# Patient Record
Sex: MALE | Race: WHITE | NOT HISPANIC OR LATINO | URBAN - METROPOLITAN AREA
[De-identification: names, ages, dates, MRNs, and addresses within clinical notes are randomized per-mention and may not be internally consistent; named-entity substitution may affect disease eponyms.]

---

## 2017-04-21 ENCOUNTER — COMMUNICATION - HEALTHEAST (OUTPATIENT)
Dept: FAMILY MEDICINE | Facility: CLINIC | Age: 72
End: 2017-04-21

## 2017-04-28 ENCOUNTER — RECORDS - HEALTHEAST (OUTPATIENT)
Dept: ADMINISTRATIVE | Facility: OTHER | Age: 72
End: 2017-04-28

## 2017-05-08 ENCOUNTER — OFFICE VISIT - HEALTHEAST (OUTPATIENT)
Dept: FAMILY MEDICINE | Facility: CLINIC | Age: 72
End: 2017-05-08

## 2017-05-08 DIAGNOSIS — Z01.818 PREOP EXAMINATION: ICD-10-CM

## 2017-05-08 DIAGNOSIS — E11.9 DIABETES (H): ICD-10-CM

## 2017-05-08 LAB
CHOLEST SERPL-MCNC: 129 MG/DL
FASTING STATUS PATIENT QL REPORTED: NO
HBA1C MFR BLD: 7.4 % (ref 3.5–6)
HDLC SERPL-MCNC: 39 MG/DL
LDLC SERPL CALC-MCNC: 46 MG/DL
TRIGL SERPL-MCNC: 218 MG/DL

## 2017-05-08 ASSESSMENT — MIFFLIN-ST. JEOR: SCORE: 1943.64

## 2017-05-09 ENCOUNTER — COMMUNICATION - HEALTHEAST (OUTPATIENT)
Dept: FAMILY MEDICINE | Facility: CLINIC | Age: 72
End: 2017-05-09

## 2017-05-11 ASSESSMENT — MIFFLIN-ST. JEOR
SCORE: 1943.28
SCORE: 1943.28

## 2017-05-16 ENCOUNTER — ANESTHESIA - HEALTHEAST (OUTPATIENT)
Dept: SURGERY | Facility: CLINIC | Age: 72
End: 2017-05-16

## 2017-05-16 ENCOUNTER — SURGERY - HEALTHEAST (OUTPATIENT)
Dept: SURGERY | Facility: CLINIC | Age: 72
End: 2017-05-16

## 2017-05-17 ENCOUNTER — ANESTHESIA - HEALTHEAST (OUTPATIENT)
Dept: SURGERY | Facility: CLINIC | Age: 72
End: 2017-05-17

## 2017-05-17 ENCOUNTER — SURGERY - HEALTHEAST (OUTPATIENT)
Dept: SURGERY | Facility: CLINIC | Age: 72
End: 2017-05-17

## 2017-05-17 ASSESSMENT — MIFFLIN-ST. JEOR
SCORE: 1955.98
SCORE: 1955.98

## 2017-05-24 ENCOUNTER — OFFICE VISIT - HEALTHEAST (OUTPATIENT)
Dept: FAMILY MEDICINE | Facility: CLINIC | Age: 72
End: 2017-05-24

## 2017-05-24 DIAGNOSIS — I10 ESSENTIAL HYPERTENSION WITH GOAL BLOOD PRESSURE LESS THAN 130/80: ICD-10-CM

## 2017-05-24 DIAGNOSIS — I25.10 CARDIOVASCULAR DISEASE: ICD-10-CM

## 2017-06-02 ENCOUNTER — RECORDS - HEALTHEAST (OUTPATIENT)
Dept: ADMINISTRATIVE | Facility: OTHER | Age: 72
End: 2017-06-02

## 2017-06-16 ENCOUNTER — RECORDS - HEALTHEAST (OUTPATIENT)
Dept: ADMINISTRATIVE | Facility: OTHER | Age: 72
End: 2017-06-16

## 2017-07-14 ENCOUNTER — RECORDS - HEALTHEAST (OUTPATIENT)
Dept: ADMINISTRATIVE | Facility: OTHER | Age: 72
End: 2017-07-14

## 2017-07-31 ENCOUNTER — COMMUNICATION - HEALTHEAST (OUTPATIENT)
Dept: FAMILY MEDICINE | Facility: CLINIC | Age: 72
End: 2017-07-31

## 2017-08-25 ENCOUNTER — RECORDS - HEALTHEAST (OUTPATIENT)
Dept: ADMINISTRATIVE | Facility: OTHER | Age: 72
End: 2017-08-25

## 2017-09-11 ENCOUNTER — OFFICE VISIT - HEALTHEAST (OUTPATIENT)
Dept: PODIATRY | Facility: CLINIC | Age: 72
End: 2017-09-11

## 2017-09-11 DIAGNOSIS — M20.40 HAMMER TOE, UNSPECIFIED LATERALITY: ICD-10-CM

## 2017-09-11 DIAGNOSIS — M21.6X9 CAVUS DEFORMITY OF FOOT, UNSPECIFIED LATERALITY: ICD-10-CM

## 2017-09-11 DIAGNOSIS — L60.2 ONYCHAUXIS: ICD-10-CM

## 2017-09-15 ENCOUNTER — AMBULATORY - HEALTHEAST (OUTPATIENT)
Dept: PODIATRY | Facility: CLINIC | Age: 72
End: 2017-09-15

## 2017-09-15 ENCOUNTER — RECORDS - HEALTHEAST (OUTPATIENT)
Dept: ADMINISTRATIVE | Facility: OTHER | Age: 72
End: 2017-09-15

## 2017-09-27 ENCOUNTER — RECORDS - HEALTHEAST (OUTPATIENT)
Dept: ADMINISTRATIVE | Facility: OTHER | Age: 72
End: 2017-09-27

## 2017-10-05 ENCOUNTER — COMMUNICATION - HEALTHEAST (OUTPATIENT)
Dept: ADMINISTRATIVE | Facility: CLINIC | Age: 72
End: 2017-10-05

## 2017-10-10 ENCOUNTER — RECORDS - HEALTHEAST (OUTPATIENT)
Dept: ADMINISTRATIVE | Facility: OTHER | Age: 72
End: 2017-10-10

## 2017-10-12 ENCOUNTER — AMBULATORY - HEALTHEAST (OUTPATIENT)
Dept: PODIATRY | Facility: CLINIC | Age: 72
End: 2017-10-12

## 2017-10-26 ENCOUNTER — AMBULATORY - HEALTHEAST (OUTPATIENT)
Dept: PODIATRY | Facility: CLINIC | Age: 72
End: 2017-10-26

## 2017-10-26 DIAGNOSIS — M20.42 HAMMER TOES OF BOTH FEET: ICD-10-CM

## 2017-10-26 DIAGNOSIS — G62.9 NEUROPATHY: ICD-10-CM

## 2017-10-26 DIAGNOSIS — M20.41 HAMMER TOES OF BOTH FEET: ICD-10-CM

## 2017-10-26 DIAGNOSIS — E11.8 TYPE 2 DIABETES MELLITUS WITH COMPLICATION, WITHOUT LONG-TERM CURRENT USE OF INSULIN (H): ICD-10-CM

## 2017-10-26 DIAGNOSIS — Q66.70 CAVUS DEFORMITY: ICD-10-CM

## 2017-10-30 ENCOUNTER — RECORDS - HEALTHEAST (OUTPATIENT)
Dept: ADMINISTRATIVE | Facility: OTHER | Age: 72
End: 2017-10-30

## 2017-11-02 ENCOUNTER — COMMUNICATION - HEALTHEAST (OUTPATIENT)
Dept: ADMINISTRATIVE | Facility: CLINIC | Age: 72
End: 2017-11-02

## 2018-01-01 ENCOUNTER — COMMUNICATION - HEALTHEAST (OUTPATIENT)
Dept: FAMILY MEDICINE | Facility: CLINIC | Age: 73
End: 2018-01-01

## 2018-01-01 ENCOUNTER — COMMUNICATION - HEALTHEAST (OUTPATIENT)
Dept: OTHER | Facility: CLINIC | Age: 73
End: 2018-01-01

## 2018-01-01 ENCOUNTER — OFFICE VISIT - HEALTHEAST (OUTPATIENT)
Dept: OTOLARYNGOLOGY | Facility: CLINIC | Age: 73
End: 2018-01-01

## 2018-01-01 ENCOUNTER — AMBULATORY - HEALTHEAST (OUTPATIENT)
Dept: OTHER | Facility: CLINIC | Age: 73
End: 2018-01-01

## 2018-01-01 ENCOUNTER — OFFICE VISIT - HEALTHEAST (OUTPATIENT)
Dept: PODIATRY | Facility: CLINIC | Age: 73
End: 2018-01-01

## 2018-01-01 ENCOUNTER — OFFICE VISIT - HEALTHEAST (OUTPATIENT)
Dept: FAMILY MEDICINE | Facility: CLINIC | Age: 73
End: 2018-01-01

## 2018-01-01 DIAGNOSIS — E11.8 TYPE 2 DIABETES MELLITUS WITH COMPLICATION, WITHOUT LONG-TERM CURRENT USE OF INSULIN (H): ICD-10-CM

## 2018-01-01 DIAGNOSIS — G25.0 ESSENTIAL TREMOR: ICD-10-CM

## 2018-01-01 DIAGNOSIS — G60.9 HEREDITARY AND IDIOPATHIC PERIPHERAL NEUROPATHY: ICD-10-CM

## 2018-01-01 DIAGNOSIS — E78.5 HYPERLIPIDEMIA, UNSPECIFIED HYPERLIPIDEMIA TYPE: ICD-10-CM

## 2018-01-01 DIAGNOSIS — Z23 NEED FOR IMMUNIZATION AGAINST INFLUENZA: ICD-10-CM

## 2018-01-01 DIAGNOSIS — R35.0 BENIGN PROSTATIC HYPERPLASIA WITH URINARY FREQUENCY: ICD-10-CM

## 2018-01-01 DIAGNOSIS — R35.0 URINARY FREQUENCY: ICD-10-CM

## 2018-01-01 DIAGNOSIS — E11.49 TYPE 2 DIABETES MELLITUS WITH OTHER DIABETIC NEUROLOGICAL COMPLICATION (H): ICD-10-CM

## 2018-01-01 DIAGNOSIS — I10 ESSENTIAL HYPERTENSION: ICD-10-CM

## 2018-01-01 DIAGNOSIS — R26.81 GAIT INSTABILITY: ICD-10-CM

## 2018-01-01 DIAGNOSIS — M20.41 OTHER HAMMER TOE(S) (ACQUIRED), RIGHT FOOT: ICD-10-CM

## 2018-01-01 DIAGNOSIS — N40.1 BENIGN PROSTATIC HYPERPLASIA WITH URINARY FREQUENCY: ICD-10-CM

## 2018-01-01 DIAGNOSIS — Z00.00 MEDICARE ANNUAL WELLNESS VISIT, SUBSEQUENT: ICD-10-CM

## 2018-01-01 DIAGNOSIS — H61.23 HEARING LOSS DUE TO CERUMEN IMPACTION, BILATERAL: ICD-10-CM

## 2018-01-01 DIAGNOSIS — E03.9 HYPOTHYROIDISM, UNSPECIFIED TYPE: ICD-10-CM

## 2018-01-01 DIAGNOSIS — M20.41 HAMMER TOES OF BOTH FEET: ICD-10-CM

## 2018-01-01 DIAGNOSIS — M20.42 HAMMER TOES OF BOTH FEET: ICD-10-CM

## 2018-01-01 DIAGNOSIS — Q66.70 CONGENITAL PES CAVUS: ICD-10-CM

## 2018-01-01 DIAGNOSIS — E11.9 DIABETES MELLITUS, TYPE 2 (H): ICD-10-CM

## 2018-01-01 DIAGNOSIS — M20.42 OTHER HAMMER TOE(S) (ACQUIRED), LEFT FOOT: ICD-10-CM

## 2018-01-01 DIAGNOSIS — Z00.00 HEALTHCARE MAINTENANCE: ICD-10-CM

## 2018-01-01 DIAGNOSIS — Q66.70 CAVUS DEFORMITY: ICD-10-CM

## 2018-01-01 DIAGNOSIS — R53.83 FATIGUE: ICD-10-CM

## 2018-01-01 LAB
25(OH)D3 SERPL-MCNC: 47.2 NG/ML (ref 30–80)
ALBUMIN SERPL-MCNC: 4.2 G/DL (ref 3.5–5)
ALP SERPL-CCNC: 50 U/L (ref 45–120)
ALT SERPL W P-5'-P-CCNC: 19 U/L (ref 0–45)
ANION GAP SERPL CALCULATED.3IONS-SCNC: 14 MMOL/L (ref 5–18)
AST SERPL W P-5'-P-CCNC: 19 U/L (ref 0–40)
BILIRUB SERPL-MCNC: 0.4 MG/DL (ref 0–1)
BUN SERPL-MCNC: 21 MG/DL (ref 8–28)
CALCIUM SERPL-MCNC: 10.4 MG/DL (ref 8.5–10.5)
CHLORIDE BLD-SCNC: 102 MMOL/L (ref 98–107)
CHOLEST SERPL-MCNC: 137 MG/DL
CO2 SERPL-SCNC: 22 MMOL/L (ref 22–31)
CREAT SERPL-MCNC: 0.99 MG/DL (ref 0.7–1.3)
ERYTHROCYTE [DISTWIDTH] IN BLOOD BY AUTOMATED COUNT: 11.8 % (ref 11–14.5)
FASTING STATUS PATIENT QL REPORTED: NO
GFR SERPL CREATININE-BSD FRML MDRD: >60 ML/MIN/1.73M2
GLUCOSE BLD-MCNC: 220 MG/DL (ref 70–125)
HBA1C MFR BLD: 7 % (ref 3.5–6)
HCT VFR BLD AUTO: 43.5 % (ref 40–54)
HDLC SERPL-MCNC: 50 MG/DL
HGB BLD-MCNC: 14.3 G/DL (ref 14–18)
LDLC SERPL CALC-MCNC: 55 MG/DL
MCH RBC QN AUTO: 28.1 PG (ref 27–34)
MCHC RBC AUTO-ENTMCNC: 32.9 G/DL (ref 32–36)
MCV RBC AUTO: 86 FL (ref 80–100)
PLATELET # BLD AUTO: 174 THOU/UL (ref 140–440)
PMV BLD AUTO: 8.7 FL (ref 7–10)
POTASSIUM BLD-SCNC: 3.8 MMOL/L (ref 3.5–5)
PROT SERPL-MCNC: 7.4 G/DL (ref 6–8)
PSA SERPL-MCNC: 2.4 NG/ML (ref 0–6.5)
RBC # BLD AUTO: 5.08 MILL/UL (ref 4.4–6.2)
SODIUM SERPL-SCNC: 138 MMOL/L (ref 136–145)
TRIGL SERPL-MCNC: 161 MG/DL
TSH SERPL DL<=0.005 MIU/L-ACNC: 3.55 UIU/ML (ref 0.3–5)
VIT B12 SERPL-MCNC: 470 PG/ML (ref 213–816)
WBC: 6.2 THOU/UL (ref 4–11)

## 2018-01-01 RX ORDER — BLOOD-GLUCOSE METER
1 EACH MISCELLANEOUS DAILY
Qty: 1 EACH | Refills: 0 | Status: SHIPPED | OUTPATIENT
Start: 2018-01-01

## 2018-01-01 RX ORDER — LEVOTHYROXINE SODIUM 100 UG/1
100 TABLET ORAL DAILY
Qty: 90 TABLET | Refills: 3 | Status: SHIPPED | OUTPATIENT
Start: 2018-01-01

## 2018-01-01 RX ORDER — LANCETS 33 GAUGE
EACH MISCELLANEOUS
Qty: 100 EACH | Refills: 3 | Status: SHIPPED | OUTPATIENT
Start: 2018-01-01

## 2018-01-01 RX ORDER — TRIAMTERENE AND HYDROCHLOROTHIAZIDE 37.5; 25 MG/1; MG/1
1 CAPSULE ORAL EVERY MORNING
Qty: 90 CAPSULE | Refills: 3 | Status: SHIPPED | OUTPATIENT
Start: 2018-01-01

## 2018-01-01 RX ORDER — GABAPENTIN 300 MG/1
300 CAPSULE ORAL 3 TIMES DAILY
Qty: 270 CAPSULE | Refills: 3 | Status: SHIPPED | OUTPATIENT
Start: 2018-01-01

## 2018-01-01 RX ORDER — METOPROLOL SUCCINATE 100 MG/1
100 TABLET, EXTENDED RELEASE ORAL DAILY
Qty: 90 TABLET | Refills: 3 | Status: SHIPPED | OUTPATIENT
Start: 2018-01-01

## 2018-01-01 RX ORDER — ATORVASTATIN CALCIUM 20 MG/1
20 TABLET, FILM COATED ORAL DAILY
Qty: 90 TABLET | Refills: 3 | Status: SHIPPED | OUTPATIENT
Start: 2018-01-01

## 2018-01-01 RX ORDER — AMLODIPINE BESYLATE 10 MG/1
10 TABLET ORAL DAILY
Qty: 90 TABLET | Refills: 3 | Status: SHIPPED | OUTPATIENT
Start: 2018-01-01

## 2018-01-01 ASSESSMENT — MIFFLIN-ST. JEOR
SCORE: 1920.6
SCORE: 1947.82

## 2018-04-24 ENCOUNTER — OFFICE VISIT - HEALTHEAST (OUTPATIENT)
Dept: FAMILY MEDICINE | Facility: CLINIC | Age: 73
End: 2018-04-24

## 2018-04-24 DIAGNOSIS — E11.9 DIABETES (H): ICD-10-CM

## 2018-04-24 DIAGNOSIS — I10 ESSENTIAL HYPERTENSION: ICD-10-CM

## 2018-04-24 DIAGNOSIS — G62.9 NEUROPATHY: ICD-10-CM

## 2018-04-24 LAB
ALBUMIN SERPL-MCNC: 4.1 G/DL (ref 3.5–5)
ALP SERPL-CCNC: 53 U/L (ref 45–120)
ALT SERPL W P-5'-P-CCNC: 15 U/L (ref 0–45)
ANION GAP SERPL CALCULATED.3IONS-SCNC: 18 MMOL/L (ref 5–18)
AST SERPL W P-5'-P-CCNC: 20 U/L (ref 0–40)
BILIRUB SERPL-MCNC: 0.5 MG/DL (ref 0–1)
BUN SERPL-MCNC: 18 MG/DL (ref 8–28)
CALCIUM SERPL-MCNC: 10.2 MG/DL (ref 8.5–10.5)
CHLORIDE BLD-SCNC: 101 MMOL/L (ref 98–107)
CHOLEST SERPL-MCNC: 128 MG/DL
CO2 SERPL-SCNC: 19 MMOL/L (ref 22–31)
CREAT SERPL-MCNC: 1.03 MG/DL (ref 0.7–1.3)
FASTING STATUS PATIENT QL REPORTED: NO
GFR SERPL CREATININE-BSD FRML MDRD: >60 ML/MIN/1.73M2
GLUCOSE BLD-MCNC: 205 MG/DL (ref 70–125)
HBA1C MFR BLD: 7.2 % (ref 3.5–6)
HDLC SERPL-MCNC: 47 MG/DL
LDLC SERPL CALC-MCNC: 52 MG/DL
POTASSIUM BLD-SCNC: 4.4 MMOL/L (ref 3.5–5)
PROT SERPL-MCNC: 7.5 G/DL (ref 6–8)
SODIUM SERPL-SCNC: 138 MMOL/L (ref 136–145)
TRIGL SERPL-MCNC: 143 MG/DL
TSH SERPL DL<=0.005 MIU/L-ACNC: 2.18 UIU/ML (ref 0.3–5)
VIT B12 SERPL-MCNC: 712 PG/ML (ref 213–816)

## 2018-04-24 ASSESSMENT — MIFFLIN-ST. JEOR: SCORE: 1920.6

## 2018-04-25 ENCOUNTER — COMMUNICATION - HEALTHEAST (OUTPATIENT)
Dept: FAMILY MEDICINE | Facility: CLINIC | Age: 73
End: 2018-04-25

## 2019-01-01 ENCOUNTER — COMMUNICATION - HEALTHEAST (OUTPATIENT)
Dept: SCHEDULING | Facility: CLINIC | Age: 74
End: 2019-01-01

## 2019-01-01 ENCOUNTER — RECORDS - HEALTHEAST (OUTPATIENT)
Dept: ADMINISTRATIVE | Facility: OTHER | Age: 74
End: 2019-01-01

## 2019-01-01 ENCOUNTER — OFFICE VISIT - HEALTHEAST (OUTPATIENT)
Dept: PHYSICAL THERAPY | Facility: REHABILITATION | Age: 74
End: 2019-01-01

## 2019-01-01 ENCOUNTER — OFFICE VISIT - HEALTHEAST (OUTPATIENT)
Dept: FAMILY MEDICINE | Facility: CLINIC | Age: 74
End: 2019-01-01

## 2019-01-01 ENCOUNTER — HOSPITAL ENCOUNTER (OUTPATIENT)
Dept: MRI IMAGING | Facility: HOSPITAL | Age: 74
Discharge: HOME | End: 2019-04-29

## 2019-01-01 ENCOUNTER — AMBULATORY - HEALTHEAST (OUTPATIENT)
Dept: SLEEP MEDICINE | Facility: CLINIC | Age: 74
End: 2019-01-01

## 2019-01-01 ENCOUNTER — OFFICE VISIT - HEALTHEAST (OUTPATIENT)
Dept: OTOLARYNGOLOGY | Facility: CLINIC | Age: 74
End: 2019-01-01

## 2019-01-01 ENCOUNTER — OFFICE VISIT - HEALTHEAST (OUTPATIENT)
Dept: SLEEP MEDICINE | Facility: CLINIC | Age: 74
End: 2019-01-01

## 2019-01-01 ENCOUNTER — COMMUNICATION - HEALTHEAST (OUTPATIENT)
Dept: FAMILY MEDICINE | Facility: CLINIC | Age: 74
End: 2019-01-01

## 2019-01-01 ENCOUNTER — RECORDS - HEALTHEAST (OUTPATIENT)
Dept: SLEEP MEDICINE | Facility: CLINIC | Age: 74
End: 2019-01-01

## 2019-01-01 ENCOUNTER — HOSPITAL ENCOUNTER (OUTPATIENT)
Dept: CARDIOLOGY | Facility: HOSPITAL | Age: 74
Discharge: HOME | End: 2019-04-29

## 2019-01-01 ENCOUNTER — AMBULATORY - HEALTHEAST (OUTPATIENT)
Dept: ADMINISTRATIVE | Facility: REHABILITATION | Age: 74
End: 2019-01-01

## 2019-01-01 DIAGNOSIS — Z86.73 HISTORY OF CVA (CEREBROVASCULAR ACCIDENT): ICD-10-CM

## 2019-01-01 DIAGNOSIS — E11.9 DIABETES MELLITUS, TYPE 2 (H): ICD-10-CM

## 2019-01-01 DIAGNOSIS — R26.89 POOR BALANCE: ICD-10-CM

## 2019-01-01 DIAGNOSIS — M62.81 GENERALIZED MUSCLE WEAKNESS: ICD-10-CM

## 2019-01-01 DIAGNOSIS — R06.81 APNEA, NOT ELSEWHERE CLASSIFIED: ICD-10-CM

## 2019-01-01 DIAGNOSIS — H61.23 BILATERAL IMPACTED CERUMEN: ICD-10-CM

## 2019-01-01 DIAGNOSIS — R26.81 GAIT INSTABILITY: ICD-10-CM

## 2019-01-01 DIAGNOSIS — E66.09 CLASS 1 OBESITY DUE TO EXCESS CALORIES WITH SERIOUS COMORBIDITY AND BODY MASS INDEX (BMI) OF 33.0 TO 33.9 IN ADULT: ICD-10-CM

## 2019-01-01 DIAGNOSIS — R35.0 BENIGN PROSTATIC HYPERPLASIA WITH URINARY FREQUENCY: ICD-10-CM

## 2019-01-01 DIAGNOSIS — G47.33 OSA (OBSTRUCTIVE SLEEP APNEA): ICD-10-CM

## 2019-01-01 DIAGNOSIS — I63.9 CEREBRAL INFARCTION, UNSPECIFIED (H): ICD-10-CM

## 2019-01-01 DIAGNOSIS — R06.81 WITNESSED APNEIC SPELLS: ICD-10-CM

## 2019-01-01 DIAGNOSIS — Z28.39 IMMUNIZATION DEFICIENCY: ICD-10-CM

## 2019-01-01 DIAGNOSIS — G47.10 EXCESSIVE SLEEPINESS: ICD-10-CM

## 2019-01-01 DIAGNOSIS — R06.83 SNORING: ICD-10-CM

## 2019-01-01 DIAGNOSIS — R26.89 BALANCE PROBLEM: ICD-10-CM

## 2019-01-01 DIAGNOSIS — Z90.79 S/P TURP (TRANSURETHRAL RESECTION OF PROSTATE): ICD-10-CM

## 2019-01-01 DIAGNOSIS — E66.811 CLASS 1 OBESITY DUE TO EXCESS CALORIES WITH SERIOUS COMORBIDITY AND BODY MASS INDEX (BMI) OF 33.0 TO 33.9 IN ADULT: ICD-10-CM

## 2019-01-01 DIAGNOSIS — I63.9 CEREBROVASCULAR ACCIDENT (CVA), UNSPECIFIED MECHANISM (H): ICD-10-CM

## 2019-01-01 DIAGNOSIS — I63.9 CVA (CEREBRAL VASCULAR ACCIDENT) (H): ICD-10-CM

## 2019-01-01 DIAGNOSIS — N40.1 BENIGN PROSTATIC HYPERPLASIA WITH URINARY FREQUENCY: ICD-10-CM

## 2019-01-01 DIAGNOSIS — G25.0 ESSENTIAL TREMOR: ICD-10-CM

## 2019-01-01 DIAGNOSIS — Z23 ENCOUNTER FOR IMMUNIZATION: ICD-10-CM

## 2019-01-01 DIAGNOSIS — G47.10 HYPERSOMNIA, UNSPECIFIED: ICD-10-CM

## 2019-01-01 LAB
AORTIC ROOT: 3.7 CM
AORTIC VALVE MEAN VELOCITY: 104 CM/S
AV DIMENSIONLESS INDEX VTI: 0.5
AV MEAN GRADIENT: 5 MMHG
AV PEAK GRADIENT: 8 MMHG
AV VALVE AREA: 1.9 CM2
AV VELOCITY RATIO: 0.5
BSA FOR ECHO PROCEDURE: 2.44 M2
CREAT BLD-MCNC: 1.1 MG/DL (ref 0.7–1.3)
CREAT UR-MCNC: 151.2 MG/DL
CV BLOOD PRESSURE: ABNORMAL MMHG
CV ECHO HEIGHT: 72 IN
CV ECHO WEIGHT: 259 LBS
DOP CALC AO PEAK VEL: 141 CM/S
DOP CALC AO VTI: 31.8 CM
DOP CALC LVOT AREA: 4.15 CM2
DOP CALC LVOT DIAMETER: 2.3 CM
DOP CALC LVOT PEAK VEL: 67.2 CM/S
DOP CALC LVOT STROKE VOLUME: 61.9 CM3
DOP CALCLVOT PEAK VEL VTI: 14.9 CM
EJECTION FRACTION: 55 % (ref 55–75)
FRACTIONAL SHORTENING: 24 % (ref 28–44)
GFR SERPL CREATININE-BSD FRML MDRD: >60 ML/MIN/1.73M2
HBA1C MFR BLD: 6.6 % (ref 3.5–6)
INTERVENTRICULAR SEPTUM IN END DIASTOLE: 1.11 CM (ref 0.6–1)
IVS/PW RATIO: 0.9
LA AREA 1: 29 CM2
LA AREA 2: 29 CM2
LEFT ATRIUM LENGTH: 6.4 CM
LEFT ATRIUM SIZE: 4.7 CM
LEFT ATRIUM VOLUME INDEX: 45.8 ML/M2
LEFT ATRIUM VOLUME: 111.7 ML
LEFT VENTRICLE CARDIAC INDEX: 2 L/MIN/M2
LEFT VENTRICLE CARDIAC OUTPUT: 4.9 L/MIN
LEFT VENTRICLE DIASTOLIC VOLUME INDEX: 51.2 CM3/M2 (ref 34–74)
LEFT VENTRICLE DIASTOLIC VOLUME: 125 CM3 (ref 62–150)
LEFT VENTRICLE HEART RATE: 80 BPM
LEFT VENTRICLE MASS INDEX: 99.2 G/M2
LEFT VENTRICLE SYSTOLIC VOLUME INDEX: 22.9 CM3/M2 (ref 11–31)
LEFT VENTRICLE SYSTOLIC VOLUME: 55.8 CM3 (ref 21–61)
LEFT VENTRICULAR INTERNAL DIMENSION IN DIASTOLE: 5.34 CM (ref 4.2–5.8)
LEFT VENTRICULAR INTERNAL DIMENSION IN SYSTOLE: 4.06 CM (ref 2.5–4)
LEFT VENTRICULAR MASS: 242 G
LEFT VENTRICULAR OUTFLOW TRACT MEAN GRADIENT: 1 MMHG
LEFT VENTRICULAR OUTFLOW TRACT MEAN VELOCITY: 53.8 CM/S
LEFT VENTRICULAR OUTFLOW TRACT PEAK GRADIENT: 2 MMHG
LEFT VENTRICULAR POSTERIOR WALL IN END DIASTOLE: 1.17 CM (ref 0.6–1)
LV STROKE VOLUME INDEX: 25.4 ML/M2
MICROALBUMIN UR-MCNC: 9.72 MG/DL (ref 0–1.99)
MICROALBUMIN/CREAT UR: 64.3 MG/G
MV AVERAGE E/E' RATIO: 10.7 CM/S
MV DECELERATION TIME: 148 MS
MV E'TISSUE VEL-LAT: 11.4 CM/S
MV E'TISSUE VEL-MED: 7.93 CM/S
MV LATERAL E/E' RATIO: 9
MV MEDIAL E/E' RATIO: 13
MV PEAK E VELOCITY: 103 CM/S
NUC REST DIASTOLIC VOLUME INDEX: 4144 LBS
NUC REST SYSTOLIC VOLUME INDEX: 72 IN
TRICUSPID REGURGITATION PEAK PRESSURE GRADIENT: 21.5 MMHG
TRICUSPID VALVE ANULAR PLANE SYSTOLIC EXCURSION: 1.3 CM
TRICUSPID VALVE PEAK REGURGITANT VELOCITY: 232 CM/S

## 2019-01-01 RX ORDER — TAMSULOSIN HYDROCHLORIDE 0.4 MG/1
0.8 CAPSULE ORAL
Qty: 180 CAPSULE | Refills: 3 | Status: SHIPPED | OUTPATIENT
Start: 2019-01-01

## 2019-01-01 ASSESSMENT — MIFFLIN-ST. JEOR
SCORE: 1925.14
SCORE: 1908.81
SCORE: 1947.82
SCORE: 1906.99

## 2021-05-28 NOTE — PROGRESS NOTES
[facility] Certification Request    May 16, 2019      Patient: Tony Hoffman  MR Number: [id number]  YOB: 1945  Date of Visit: 5/16/2019      Dear Dr. Cj Harris,     Thank you for this referral.   We are seeing Tony Hoffman for Physical Therapy for CVA.    Medicare and/or Medicaid requires physician review and approval of the treatment plan. Please review the plan of care and verify that you agree with the therapy plan of care by co-signing this note.      Plan of Care  Authorization / Certification Start Date: 05/16/19  Authorization / Certification End Date: 08/14/19  Authorization / Certification Number of Visits: up to 12 sessions  Communication with: Referral Source  Patient Related Instruction: Treatment plan and rationale;Self Care instruction;Nature of Condition;Basis of treatment;Body mechanics;Precautions;Next steps;Expected outcome  Times per Week: 2x/week  Number of Weeks: up to 10 weeks  Number of Visits: up to 12 visit   Discharge Planning: to I HEP  Therapeutic Exercise: ROM;Stretching;Strengthening  Neuromuscular Reeducation: kinesio tape;posture;balance/proprioception;TNE;core  Manual Therapy: soft tissue mobilization;myofascial release;joint mobilization;muscle energy  Other Plan #1: therapeutic activity       Goals:  Pt. will demonstrate/verbalize independence in self-management of condition in : 2 weeks  Pt. will be independent with home exercise program in : 2 weeks    Pt will: reduce TUG to <13.5 seconds to decrease risk for falls in 8 weeks  Pt will: ambulate at gait speed of 0.95 m/s or greater to improve gait efficiency and decrease falls risk in 8 weeks  Pt will: perform >6 reps withOUT UE support on 30 sec chair stand to improve LE strength in 8 weeks  Pt will: perform 4 square step test in <12.0 seconds without dowel strikes to improve dynamic balance in 8 weeks        If you have any questions or concerns, please don't hesitate to  call.    Sincerely,      Jeni Andersen, PT        Physician recommendation:     ___ Follow therapist's recommendation        ___ Modify therapy      *Physician co-signature indicates they certify the need for these services furnished within this plan and while under their care.    [facility]   Balance Initial Evaluation    Patient Name: Tony Hoffman  Date of evaluation: 5/16/2019  Referral Diagnosis:   Referring provider: Cj Harris MD  Visit Diagnosis:     ICD-10-CM    1. Gait instability R26.81    2. Poor balance R26.89    3. Generalized muscle weakness M62.81        Assessment:   Tony Hoffman is a 74 y.o. male who presents to therapy today with chief complaints of declining balance, falls and weakness. Pt has Hx of several strokes, with most recent occurring January-March 2019. Pt has had 2-3 falls in the last year and notices difficulty with transfers such as sit>stand due to weakness. Evaluation today reveals: TUG score of 15.78 and cognitive TUG of 18.37 seconds indicates high falls risk and difficulty with dual task. Pt was unable to sit>stand without UE support, and MMT reveals MM weakness R>L sided. Comfortable gait speed of 0.82 m/s indicates high falls risk and is below age gender normative values. Pt performed 4 square step test in 12 seconds but was unable to clear dowels and had one LOB; as dynamic balance is poor. FGA to be completed at next session. Patient will benefit from 1:1 skilled PT services to improve balance, strength and develop HEP to decrease risk for falls.     Goals:  Pt. will demonstrate/verbalize independence in self-management of condition in : 2 weeks  Pt. will be independent with home exercise program in : 2 weeks    Pt will: reduce TUG to <13.5 seconds to decrease risk for falls in 8 weeks  Pt will: ambulate at gait speed of 0.95 m/s or greater to improve gait efficiency and decrease falls risk in 8 weeks  Pt will: perform >6 reps withOUT UE support on 30 sec  chair stand to improve LE strength in 8 weeks  Pt will: perform 4 square step test in <12.0 seconds without dowel strikes to improve dynamic balance in 8 weeks      Patient's expectations/goals are realistic.    Barriers to Learning or Achieving Goals:  No Barriers.       Plan / Patient Instructions:        Plan of Care:   Authorization / Certification Start Date: 05/16/19  Authorization / Certification End Date: 08/14/19  Authorization / Certification Number of Visits: up to 12 sessions  Communication with: Referral Source  Patient Related Instruction: Treatment plan and rationale;Self Care instruction;Nature of Condition;Basis of treatment;Body mechanics;Precautions;Next steps;Expected outcome  Times per Week: 2x/week  Number of Weeks: up to 10 weeks  Number of Visits: up to 12 visit   Discharge Planning: to I HEP  Therapeutic Exercise: ROM;Stretching;Strengthening  Neuromuscular Reeducation: kinesio tape;posture;balance/proprioception;TNE;core  Manual Therapy: soft tissue mobilization;myofascial release;joint mobilization;muscle energy  Other Plan #1: therapeutic activity       Plan for next visit: FGA, initiate HEP, balance/strengthening      Subjective:          Tony Wasserman is a 75 y/o male who presents today with chief c/o weakness, declining balance. Pt has Hx of two strokes- one was hemorrhagic and one was ischemic. He was thought to of also had a prior stroke affecting his cerebellum. His hemorrhagic stroke occurred in [place] when he fell.   Patient does note his balance has worsened since his first stroke. He also has trouble getting in/out of chairs, but had some of this before his stroke.     Diagnosis: CVA   Date of diagnosis: 1/5/119  Past medical history/precautions: Hx of B TKA, see below for other    Living Situation: condo, 2 story, lives alone, full flight of stairs inside with 1 HR. Easier going up than coming down, feels nervous with this.   Caregiver Support: no family in the area, connects with  his friends   Equipment: does own a walking stick and uses occasionally for balance. Owns a cane and walker.   Prior Functional Status: independent   Current Activity Level: mostly sedentary now   Chief Complaint: difficulty getting out of chairs/couches     Patient's Functional Goal: improve balance, strength     Fall history: 2-3 falls in the last year. One fall in [place] and may have hit his head- starting CVA. He fell once in his home when he was trying to get to his phone.       Pain  No pain reported today        Objective:      Note: Items left blank indicates the item was not performed or not indicated at the time of the evaluation.    Balance Examination  1. Gait instability     2. Poor balance     3. Generalized muscle weakness       Involved side: Bilateral    Posture Observation: forward flexed   Assistive Device: none     Transitional movements:          Sit?Stand:  Modified Independent     W/C?Mat/Bed: Modified Independent  Sit? Supine:  Independent   Supine? Sit:   Independent   W/C? Car:  Not Tested    Floor? Stand:  Not Tested    Strength    Strength   L / R ROM   L / R Comments   Seated Hip Flexion 4+/4-      Knee Extension 5/5      Dorsi Flexion 4/4-                   Supine Straight Leg Raise (degrees)       Quad activation             Side lying Hip Abduction 4/4-      Hip Adduction             Prone Hip Extension 4/4-      Knee Flexion 4/4-            Standing Plantar Flexion*       Dorsi Flexion        *Standing PF (single heel raise with UE support for balance only):  5= 16-20 heel raises  4= 10-15 heel raises  3= 5-9 heel raises  2= 1-4 heel raises    Timed Up and Go (TUG):   Average: 15.78 seconds   AD used? none    TUG Motor: NT seconds  TUG Cognitive: 18.37 seconds  Task: counting down from 100 by 3's. Hesitates, makes to 97.    30 second sit<>stand: 8 reps  UE support? B UE   Age gender norm: 15    Balance:    Firm Surface (seconds) Unstable Surface (seconds)    EO EC EO EC   Romberg  (feet together)       Sharpened Romberg (tandem)       Semi-Romberg (small step)           Other Balance Test: (Qiu, Tinettti, FGA, Mini-BESTest)        Four Square Step Test (motor planning): Trial 1: 12.06 seconds Trial 2: 12.36 seconds.  (>15 seconds falls risk for best of 2 trials) CGA, one major LOB with stepping strategy. 3 dowel strikes each trial.     Gait speed (10 meter walk test):  Comfortable gait speed: 7.28 seconds, 0.82 m/s  Age gender norm: 1.38 m/s  AD used? none    Fast gait speed: 5.35 seconds, 1.21 m/s  Age gender norm: 1.83 m/s  AD used? None     Gait observation:   L hand somewhat flaccid with decreased L UE arm swing, decreased foot clearance B and poor heel strike. Slight foot drop B. Poor trunk rotation with amb.        Two Minute Walk Test  Total meters walked: 124.6 meters  AD used? none  Age gender norm: 172.4 meters      Treatment Today     TREATMENT MINUTES COMMENTS   Evaluation 55 Moderate complexity evaluation    Self-care/ Home management     Manual therapy     Neuromuscular Re-education     Therapeutic Activity     Therapeutic Exercises     Gait training     Modality__________________                Total 55    Blank areas are intentional and mean the treatment did not include these items.     PT Evaluation Code: (Please list factors)  Patient History/Comorbidities: essential tremor, L TKA, neuropathy, DM II, CVD, HTN  Examination: see above  Clinical Presentation: unstable, progressive, changing function  Clinical Decision Making: moderate    Patient History/  Comorbidities Examination  (body structures and functions, activity limitations, and/or participation restrictions) Clinical Presentation Clinical Decision Making (Complexity)   No documented Comorbidities or personal factors 1-2 Elements Stable and/or uncomplicated Low   1-2 documented comorbidities or personal factor 3 Elements Evolving clinical presentation with changing characteristics Moderate   3-4 documented  comorbidities or personal factors 4 or more Unstable and unpredictable High                Jeni Andersen  5/16/2019  9:30 AM

## 2021-05-29 NOTE — PROGRESS NOTES
[facility] Daily Progress     Patient Name: Tony Hoffman  Date: 2019  Visit #: 10/12 visits - [facility]/Medicare   PTA visit #:    Referral Diagnosis:   Referring provider: Roland Louis MD  Visit Diagnosis:     ICD-10-CM    1. Gait instability R26.81    2. Poor balance R26.89    3. Generalized muscle weakness M62.81          Assessment:   Patient is demonstrating improved balance with reps on uneven surfaces and improved step clearance and able to maintain with distraction.   Patient is benefitting from skilled physical therapy and is making steady progress toward functional goals.  Patient is appropriate to continue with skilled physical therapy intervention, as indicated by initial plan of care.    Goal Status:  Pt. will demonstrate/verbalize independence in self-management of condition in : 2 weeks (met)  Pt. will be independent with home exercise program in : 2 weeks (met)  Pt will: reduce TUG to <13.5 seconds to decrease risk for falls in 8 weeks (met)  Pt will: ambulate at gait speed of 0.95 m/s or greater to improve gait efficiency and decrease falls risk in 8 weeks (progressing)  Pt will: perform >6 reps withOUT UE support on 30 sec chair stand to improve LE strength in 8 weeks (met)  Pt will: perform 4 square step test in <12.0 seconds without dowel strikes to improve dynamic balance in 8 weeks (progressing)    Plan / Patient Education:     Continue with initial plan of care.  Progress with home program as tolerated.    Quarter and 180 degree turns     Subjective:     Pain Ratin   He saw the cardiologist yesterday and was given a good report. He states he was instructed he could begin weight lifting at the gym. He has follow up with the sleep MD this afternoon. He states his energy has not been as bad, but still feels fatigue and that it is due to poor sleep     Objective:     Therapeutic exercise performed today:    Nu-step WL, 8, 8:00 total. Average SPM: 61 trying to use the legs more,  "less arm work.     - Step ups onto 6\" step into high knee march x 12 reps each side, non alternating, used railing as needed for balance, CGA from therapist x 2    - lateral step ups on to 6\" step x 12 reps each way, again using rails for balance as needed.     - sit to stands from standard chair with airex pad under x 12 reps, CGA from therapist as needed to cues correction of the posterior leaning with initial stance and then used rails 50% of the time to control sitting.     - eccentric step downs from 3 in book using rails for balance as needed x 12 reps each side       Neuromuscular re-education performed today:    Dynamic steps forward standing on yellow foam stepping to red foam over ladonna alternating steps x 20 reps     Dynamic steps laterally standing on yellow foam stepping to red, or green over ladonna x 20 reps alternating steps     Standing on yellow foam and stepping 1/4 turn steps to green foam to right and back then red foam to left and back alternating sides x 20 reps     Standing and toe tapping onto small ball up step with cues for light touch/tap and not to break the egg, alternating steps x 20 reps       Hip hinges: placing airex pad under both feet x 15 reps      Shoulder taps standing on airex pad: cues for straight movement as patient tends to rotate to right x 15 reps      Alternating unilateral shoulder taps standing on airex pad x 20 reps, increased difficulty with balance turning to the left     Walking in hallways  - big steps and arm swing, added in 1/4 to 1/2 turns in hallways  - added cognitive task of naming [employer]   Balance exercises:  - Standing on a pillow with feet together and eyes closed. Hold x 30 seconds, 2-3 times.   - Standing on one leg x 30 seconds, 2-3 times each leg  - Tandem stance (heel to toe stand)  o Add head turns with left/center, right/center, up/center, down/center  o X 30, 2-3 times with both legs in front/behind   - Cross overs: right over left, " left over right with BIG steps (counter top nearby for support if needed)    HEP:  - bridge - bridge with abduction   - TA -   - Clamshell L2 band   - supine SLR  - chair stands   - balance ex's  - treadmill at the gym     Treatment Today     TREATMENT MINUTES COMMENTS   Evaluation     Self-care/ Home management     Manual therapy     Neuromuscular Re-education 30    Therapeutic Activity     Therapeutic Exercises 25    Gait training     Modality__________________                Total 55    Blank areas are intentional and mean the treatment did not include these items.       Bonny Prajapati  6/20/2019

## 2021-05-29 NOTE — PROGRESS NOTES
HISTORY OF PRESENT ILLNESS  Patient comes in for removal of cerumen impactions.     REVIEW OF SYSTEMS  Review of Systems: a 10-system review was performed. Pertinent positives are noted in the HPI and on a separate scanned document in the chart.    PMH, PSH, FH and SH has documented in the EHR.      EXAM    CONSTITUTIONAL  General Appearance:  Normal, well developed, well nourished, no obvious distress  Ability to Communicate:  communicates appropriately.    HEAD AND FACE  Appearance and Symmetry:  Normal, no scalp or facial scarring or suspicious lesions.    EARS  Clinical speech reception threshold:  Normal, able to hear normal speech.  Auricle:  Normal, Auricles without scars, lesions, masses.  External auditory canal: Bilateral cerumen impactions debrided under otomicroscopy with micros dissection technique.   Tympanic membrane:  Normal, Tympanic membranes normal without swelling or erythema.    NEUROLOGICAL  Speech pattern:  Normal, Proasaic    RESPIRATORY  Symmetry and Respiratory effort:  Normal, Symmetric chest movement and expansion with no increased intercostal retractions or use of accessory muscles.     IMPRESSION  Cerumen impaction    RECOMMENDATION  Follow up as needed    Orville Menchaca MD

## 2021-05-29 NOTE — PROGRESS NOTES
Dear Cj Harris MD    Thank you for the opportunity to participate in the care of  Tony Hoffman.    Tony Hoffman is sent by Cj Harris MD for a sleep consultation regarding snoring and possible sleep apnea in the context of CVA.     He has a history of CAD, CVAs, HTN, ET, hypothyroidism, OA, and DM2.  Just had stroke in January.    Schedule - Retired.  Typically in [place] in the colder months.     Usually in bed around midnight and asleep within 10 minutes.  Up around 5 times per night to urinate and has trouble falling back asleep.   Up for the day around 6:30 - 7 AM.  Has unintentional naps during the day when watching TV.     Sleep Disordered Breathing - Frequent and loud snoring, witnessed apneas, and snort arousals.     Frequent nocturia.   Upon waking feels tired.  He denies morning headaches.  Regular morning dry mouth and morning sinus congestion.   Patient was counseled on the importance of driving while alert, to pull over if drowsy, or nap before getting into the vehicle if sleepy.    Movement/Behaviors - No somniloquy.  No somnambulism.  No sleep related eating.  No dream enactment behavior.   He denies bruxism.    Patient denies typical restless legs syndrome symptoms.    Alcohol use - 2 drinks per day in [place] (5 months) and 1 every 2 months in [place].  Caffeine intake - coffee 2 /day. Last caffeine intake is usually in the morning.  Tobacco exposure - none  Illicit substances - none    Lives with no one.  Has no pets.     No knon family history of snoring or Obstructive Sleep Apnea.    Past Medical History:  Past Medical History:   Diagnosis Date     Coronary artery disease      Diabetes mellitus (H)     Type 2     Disease of thyroid gland     hypo     Essential tremor      Hypertension      Peripheral neuropathy      Problem List:  Patient Active Problem List    Diagnosis Date Noted     Urinary frequency 07/18/2018     Status post total left knee replacement 05/16/2017     Anesthesia  05/16/2017     Hyperlipidemia, unspecified hyperlipidemia type      Neuropathy (H)      S/P total knee replacement using cement 06/26/2015     Essential tremor      Tremor      Overview Note:     Created by Conversion         Peripheral Neuropathy      Overview Note:     Created by Conversion    Replacement Utility updated for latest IMO load       Osteoarthritis Of The Knee      Overview Note:     Created by Conversion    Replacement Utility updated for latest IMO load       Hypothyroidism, unspecified type      Type 2 diabetes mellitus with complication, without long-term current use of insulin (H)      Overview Note:     Created by Conversion         Vitamin B12 Deficiency      Overview Note:     Created by Conversion         Vitamin D deficiency      Essential hypertension      Arteriosclerotic Cardiovascular Disease (ASCVD)         Past Surgical History:  Past Surgical History:   Procedure Laterality Date     CARDIAC CATHETERIZATION       CYSTOSCOPY PROSTATE W/ LASER N/A 7/27/2016    Procedure: CYSTOSCOPY WITH TRANSURETHRAL RESECTION OF THE PROSTATE WITH QUANTA LASER;  Surgeon: Vince Calderon MD;  Location: [facility] Main OR;  Service:      JOINT REPLACEMENT Right     knee     OH REVISE KNEE JOINT REPLACE,ALL PARTS Right 6/26/2015    Procedure: KNEE TOTAL ARTHROPLASTY REVISION RIGHT;  Surgeon: Ifeanyi Stevens MD;  Location: [facility] OR;  Service: Orthopedics     OH TOTAL KNEE ARTHROPLASTY Left 5/17/2017    Procedure: LEFT TOTAL KNEE ARTHROPLASTY;  Surgeon: Ifeanyi Stevens MD;  Location: [facility];  Service: Orthopedics     ROTATOR CUFF REPAIR          Meds:  Current Outpatient Medications   Medication Sig Dispense Refill     amLODIPine (NORVASC) 10 MG tablet Take 1 tablet (10 mg total) by mouth daily. 90 tablet 3     aspirin 81 MG EC tablet Take 81 mg by mouth daily.       atorvastatin (LIPITOR) 20 MG tablet Take 1 tablet (20 mg total) by mouth daily. 90 tablet 3     coenzyme Q10 200 mg capsule  Take 200 mg by mouth daily.       cyanocobalamin 1000 MCG tablet Take 1,000 mcg by mouth daily.       docoshexanoic acid-eicosapent 500 mg (FISH OIL) 500-100 mg cap capsule Take 500 mg by mouth daily.        gabapentin (NEURONTIN) 300 MG capsule Take 1 capsule (300 mg total) by mouth 3 (three) times a day. 270 capsule 3     glucosamine sulfate 500 mg cap Take 500 mg by mouth daily.        levothyroxine (SYNTHROID, LEVOTHROID) 100 MCG tablet Take 1 tablet (100 mcg total) by mouth Daily at 6:00 am. 90 tablet 3     metFORMIN (GLUCOPHAGE) 1000 MG tablet TAKE ONE TABLET BY MOUTH TWICE A DAY WITH MEALS  180 tablet 1     metoprolol succinate (TOPROL-XL) 100 MG 24 hr tablet Take 1 tablet (100 mg total) by mouth daily. 90 tablet 3     multivitamin with minerals (THERA-M) 9 mg iron-400 mcg Tab tablet Take 1 tablet by mouth daily.       nitroglycerin (NITROSTAT) 0.4 MG SL tablet Place 0.4 mg under the tongue every 5 (five) minutes as needed for chest pain.       ONETOUCH DELICA LANCETS 33 gauge Misc USE TO TEST ONCE DAILY 100 each 3     ONETOUCH ULTRA BLUE TEST STRIP strips USE TO TEST ONCE DAILY 50 strip 7     ONETOUCH ULTRA2 Use 1 each As Directed daily. Type 2 diabetes mellitus with complication, without long-term current use of insulin  E11.8 1 each 0     primidone (MYSOLINE) 50 MG tablet Take 250 mg by mouth at bedtime.              tamsulosin (FLOMAX) 0.4 mg cap Take 1 capsule (0.4 mg total) by mouth daily after supper. 90 capsule 3     triamterene-hydrochlorothiazide (DYAZIDE) 37.5-25 mg per capsule Take 1 capsule by mouth every morning. 90 capsule 3     No current facility-administered medications for this visit.         Allergies:  Lisinopril and Wellbutrin [bupropion hcl]     Social History:  Social History     Socioeconomic History     Marital status: Single     Spouse name: Not on file     Number of children: Not on file     Years of education: Not on file     Highest education level: Not on file   Occupational  History     Not on file   Social Needs     Financial resource strain: Not on file     Food insecurity:     Worry: Not on file     Inability: Not on file     Transportation needs:     Medical: Not on file     Non-medical: Not on file   Tobacco Use     Smoking status: Former Smoker     Last attempt to quit: 2012     Years since quittin.3     Smokeless tobacco: Never Used   Substance and Sexual Activity     Alcohol use: Yes     Comment: Rarely     Drug use: No     Sexual activity: Not on file   Lifestyle     Physical activity:     Days per week: Not on file     Minutes per session: Not on file     Stress: Not on file   Relationships     Social connections:     Talks on phone: Not on file     Gets together: Not on file     Attends Bahai service: Not on file     Active member of club or organization: Not on file     Attends meetings of clubs or organizations: Not on file     Relationship status: Not on file     Intimate partner violence:     Fear of current or ex partner: Not on file     Emotionally abused: Not on file     Physically abused: Not on file     Forced sexual activity: Not on file   Other Topics Concern     Not on file   Social History Narrative     Not on file        Family History:  Family History   Problem Relation Age of Onset     Anesthesia problems Neg Hx         Review of Systems: Changes in vision; constipation; excessive urination; heat or cold intolerance  A complete review of systems reviewed by me is negative with the exeption of what has been mentioned in the history of present illness.    Physical Exam:  /56 (Patient Site: Right Arm, Patient Position: Sitting, Cuff Size: Adult Regular)   Pulse (!) 57   Ht 6' (1.829 m)   Wt (!) 250 lb 6.4 oz (113.6 kg)   SpO2 96%   BMI 33.96 kg/m    General appearance: No apparent distress, well groomed.    HEENT:   Head: Normocephalic, atraumatic.  Eyes: PERRL  Nose: Nares patent.  No exudate.  No septal deviation noted.  Mouth: Teeth: Upper  partial plate and missing lower teeth.   Tongue: Normal  Oropharynx:  Mallampati Classification: II    Tonsils: Grade 0    Uvula: Normal    Neck: Supple without bruit.      Cardiac: Regular rate and rhythm.  No murmurs.  Radial pulses are strong and symmetric.  Pulmonary: Symmetric air movement, lungs clear to auscultation bilaterally.  Musculoskeletal: No edema noted.  No clubbing or cyanosis.  Skin: Warm, dry, intact.  Neurologic: Alert and oriented to person, place and time   Gait is slow and get-up-and-go testing was delayed.  Psychiatric: Affect pleasant.  Mood good.     Labs/Studies:  Lab Results   Component Value Date    WBC 6.2 10/17/2018    HGB 14.3 10/17/2018    HCT 43.5 10/17/2018    MCV 86 10/17/2018     10/17/2018         Chemistry        Component Value Date/Time     10/17/2018 1018    K 3.8 10/17/2018 1018     10/17/2018 1018    CO2 22 10/17/2018 1018    BUN 21 10/17/2018 1018    CREATININE 0.99 10/17/2018 1018     (H) 10/17/2018 1018        Component Value Date/Time    CALCIUM 10.4 10/17/2018 1018    ALKPHOS 50 10/17/2018 1018    AST 19 10/17/2018 1018    ALT 19 10/17/2018 1018    BILITOT 0.4 10/17/2018 1018        No results found for: FERRITIN  Lab Results   Component Value Date    TSH 3.55 10/17/2018     Lab Results   Component Value Date    HGBA1C 7.0 (H) 10/17/2018     2D ECHO reviewed: Date: 4/29/2019  eLVEF 55% with hypertrophy; biatrial enlargement.  Mild RV SF reduction.      Assessment and Plan:  1. Snoring  I have a high suspicion for CHEYANNE based on presence of snoring, witnessed apneas, obesity, elevated neck circumference, gender and ESS. We discussed pathophysiology of obstructive sleep apnea, testing with overnight polysomnography, and treatment modalities (CPAP only discussed at this visit). We discussed consequences of untreated Obstructive Sleep Apnea, such as markedly elevated risk for heart attack or stroke. Patient is interested in treatment and willing to  undergo overnight sleep testing.  Study has been ordered today.    - Split Night Sleep Study; Future    2. Witnessed apneic spells  Concern for CHEYANNE per above but symptoms have been present for decades.  - Split Night Sleep Study; Future    3. Excessive sleepiness  Discussed sleep hygiene and CHEYANNE as well.  - Split Night Sleep Study; Future    4. Cerebrovascular accident (CVA), unspecified mechanism (H)  Concern for CHEYANNE as a risk factor for prior and future CVAs. Discussed testing and risk reduction.  - Split Night Sleep Study; Future    5. Class 1 obesity due to excess calories with serious comorbidity and body mass index (BMI) of 33.0 to 33.9 in adult  We discussed the link between obesity, sleep apnea, and health outcomes.  Patient was encouraged to decrease caloric intake and increase activity levels to try to move towards a normal weight.  He was encouraged to discuss further strategies with his primary care provider.     Patient verbalized understanding of these issues, agrees with the plan and all questions were answered today. Patient was given an opportuntity to voice any other symptoms or concerns not listed above. Patient did not have any other symptoms or concerns.     MD SAVANNA Lopze Board Certified in Internal Medicine and Sleep Medicine  [facility].

## 2021-05-29 NOTE — PROGRESS NOTES
[facility] Daily Progress     Patient Name: Tony Hoffman  Date: 2019  Visit #: 3/12 visits - [facility]/Medicare   PTA visit #:    Referral Diagnosis:   Referring provider: Roland Louis MD  Visit Diagnosis:     ICD-10-CM    1. Gait instability R26.81    2. Poor balance R26.89    3. Generalized muscle weakness M62.81          Assessment:   Patient continues to have deficits in dynamic balance- as noted with obstacle course today. He has difficulty navigating unstable surfaces, like due to Hx of stroke and also B foot neuropathy. Plan to cont PT 2x/week.     Patient is benefitting from skilled physical therapy and is making steady progress toward functional goals.  Patient is appropriate to continue with skilled physical therapy intervention, as indicated by initial plan of care.    Goal Status:  Pt. will demonstrate/verbalize independence in self-management of condition in : 2 weeks  Pt. will be independent with home exercise program in : 2 weeks    Pt will: reduce TUG to <13.5 seconds to decrease risk for falls in 8 weeks  Pt will: ambulate at gait speed of 0.95 m/s or greater to improve gait efficiency and decrease falls risk in 8 weeks  Pt will: perform >6 reps withOUT UE support on 30 sec chair stand to improve LE strength in 8 weeks  Pt will: perform 4 square step test in <12.0 seconds without dowel strikes to improve dynamic balance in 8 weeks      Plan / Patient Education:     Continue with initial plan of care.  Progress with home program as tolerated.    Subjective:   Patient reports his balance feels worse today- has noticed it's worse for some reason. Has not been walking as often as he had.     Pain Ratin      Objective:     Therapeutic exercise performed today:    Nu-step WL, 7, 8:30 total.   - Bridges x 5 seconds x 15 reps    **Advanced today with bridges with abduction L2 band x 10 B with rest between sides  - TA sets- with breathing, difficulty performing both x 5 seconds x 15  reps  - SLR with quad set x 10 reps , 2# weight    -Clamshell L2 band no resistance x 20 reps     Other:  - Glut raises 2 x 10 with PT holding ball     Neuromuscular re-education performed today:    CGA for safety:  - Forward steps on red foam x 20, alternating,no major LOB, difficulty clearing foam L>R sides Second set: cog task of naming every other alphabet letter. No major LOB.  - Lateral steps on red foam x 20, alternating    Second set: naming countries according to alphabet letters. Difficulty clearing foam.   - Backwards steps on red foam x 20, alternating    Second set: addition from 0 by 7's. Mistakes x 2, LOB x 1.     Sit>stand x 10 without foam, without UE support    Added yellow foam x 10 reps- min A needed x 2 due to LOB posteriorly    Other balance:  Obstacle course with river rocks, foam pads, hurdles, NBOS walk. CGA needed, min A x 2 due to LOB. Pt overall very unsteady. X 8 reps x 30 feet each.     HEP:  - bridge - bridge with abduction   - TA -   - Clamshell L2 band   - supine SLR  - chair stands     Treatment Today     TREATMENT MINUTES COMMENTS   Evaluation     Self-care/ Home management     Manual therapy     Neuromuscular Re-education 25    Therapeutic Activity     Therapeutic Exercises 30    Gait training     Modality__________________                Total 55    Blank areas are intentional and mean the treatment did not include these items.       Jeni Andersen  5/29/2019

## 2021-05-29 NOTE — PROGRESS NOTES
ASSESMENT AND PLAN:  Diagnoses and all orders for this visit:    Cerebrovascular accident (CVA), unspecified mechanism (H)  Reviewed the recent neurology consultation with the patient.  They recommended against dual antiplatelet therapy and recommended aspirin alone.  The patient is in agreement with this plan and will continue with his 81 mg aspirin daily.  He will follow-up with neurology as directed.  Counseling done on stroke risk reduction and he was congratulated on his excellent control of his blood pressure and diabetes and cholesterol.  We will plan to recheck all of his labs in October here at the clinic, sooner follow-up if needed.    Benign prostatic hyperplasia with urinary frequency  Poor control of his symptoms on 0.4 mg daily, will increase to 0.8 mg daily as prescribed below.  We reviewed the risks and benefits of the medication change.  -     tamsulosin (FLOMAX) 0.4 mg cap; Take 2 capsules (0.8 mg total) by mouth daily after supper.  Dispense: 180 capsule; Refill: 3    S/P TURP (transurethral resection of prostate)  Follow-up with urology as directed.    Diabetes mellitus, type 2 (H)  -     Glycosylated Hemoglobin A1c done today shows excellent control of his diabetes.  Counseled on his combination of medication adherence, exercise, and dietary changes.  Recheck again in October.  -     Microalbumin, Random Urine    Right-sided musculoskeletal chest pain   Likely a contusion of the rib cage/chest wall.  Counseled the patient on the natural history and expected improvement of this, as long as it continues to improve and has resolved completely over the next 1 month, no need for further follow-up.  We reviewed indications for follow-up.    essential tremor  Improved with the medication prescribed by his neurologist.  He will continue to take this daily and follow-up with neurology as directed.    Immunization deficiency  Immunization review and counseling done with the patient.  -     Pneumococcal  conjugate vaccine 13-valent 6wks-17yrs; >50yrs              SUBJECTIVE: 74-year-old male new to me, previously patient of my recently retired partner Dr. Toth.  He has multiple concerns today.  Patient returns from his 5 months every winter in [place].  Unfortunately, over this winter he suffered a stroke while in [place].  The stroke presented with a change in his speech with slurring of speech that have been noticed by people around him.  He also had some worsening of his balance issues and had gone in and had a scan and was told that he had had a small infarct followed by a small area of hemorrhage.  Since returning back to [place], he has followed up with his cardiologist and his neurologist.  Patient reports that his cardiologist said there were no cardiac issues that would have caused the stroke and the neurologist recommended against dual antiplatelet therapy that have been started in [place] and the patient discontinued his Plavix.  He is now: Only taking aspirin.  Patient reports that while he was having some increased problems with his balance, he did fall and impacted the right side of his lower chest wall on a sink.  He had some moderate pain in that area ever since, it has been slowly improving over the last 4 weeks.  Pain worsens if he puts direct pressure over the lower rib cage.  Patient has a known history of BPH, status post TURP, he takes tamsulosin 0.4 mg daily but despite this was having to get up about 4 times per night to urinate.  He started taking 2 pills/day and this was reduced to 2 times per night and he was very happy with this result and is wondering about possibly switching to a 0.8 mg daily dose.  Patient reports that his tremor has improved since taking the medication prescribed by his neurologist.  He is here for follow-up on his diabetes.  He is concerned that he may be getting worse because some of his blood sugar readings have been higher.  His blood sugar readings have been  running from the 140s to the 170s.  No hypoglycemic episodes.    View of systems: No exertional chest pain, he gets some intermittent shortness of breath with exertion but this has not changed over the past year.  No blood in the urine, no blood in the stool, remainder of review of systems is as above or negative.    Past Medical History:   Diagnosis Date     Coronary artery disease      Diabetes mellitus (H)     Type 2     Disease of thyroid gland     hypo     Essential tremor      Hypertension      Peripheral neuropathy      Patient Active Problem List   Diagnosis     Osteoarthritis Of The Knee     Hypothyroidism, unspecified type     Type 2 diabetes mellitus with complication, without long-term current use of insulin (H)     Vitamin B12 Deficiency     Vitamin D deficiency     Essential hypertension     Arteriosclerotic Cardiovascular Disease (ASCVD)     Peripheral Neuropathy     Tremor     Essential tremor     S/P total knee replacement using cement     Status post total left knee replacement     Anesthesia     Hyperlipidemia, unspecified hyperlipidemia type     Neuropathy (H)     Urinary frequency     Class 1 obesity due to excess calories with serious comorbidity in adult     Benign prostatic hyperplasia with urinary frequency     S/P TURP (transurethral resection of prostate)     Cerebrovascular accident (CVA), unspecified mechanism (H)     Current Outpatient Medications   Medication Sig Dispense Refill     amLODIPine (NORVASC) 10 MG tablet Take 1 tablet (10 mg total) by mouth daily. 90 tablet 3     aspirin 81 MG EC tablet Take 81 mg by mouth daily.       atorvastatin (LIPITOR) 20 MG tablet Take 1 tablet (20 mg total) by mouth daily. 90 tablet 3     cholecalciferol, vitamin D3, (VITAMIN D3) 1,000 unit capsule Take 1,000 Units by mouth daily.       coenzyme Q10 200 mg capsule Take 200 mg by mouth daily.       cyanocobalamin 1000 MCG tablet Take 1,000 mcg by mouth daily.       docoshexanoic acid-eicosapent 500  mg (FISH OIL) 500-100 mg cap capsule Take 500 mg by mouth daily.        gabapentin (NEURONTIN) 300 MG capsule Take 1 capsule (300 mg total) by mouth 3 (three) times a day. 270 capsule 3     glucosamine sulfate 500 mg cap Take 500 mg by mouth daily.        levothyroxine (SYNTHROID, LEVOTHROID) 100 MCG tablet Take 1 tablet (100 mcg total) by mouth Daily at 6:00 am. 90 tablet 3     metFORMIN (GLUCOPHAGE) 1000 MG tablet TAKE ONE TABLET BY MOUTH TWICE A DAY WITH MEALS  180 tablet 1     metoprolol succinate (TOPROL-XL) 100 MG 24 hr tablet Take 1 tablet (100 mg total) by mouth daily. 90 tablet 3     multivitamin with minerals (THERA-M) 9 mg iron-400 mcg Tab tablet Take 1 tablet by mouth daily.       nitroglycerin (NITROSTAT) 0.4 MG SL tablet Place 0.4 mg under the tongue every 5 (five) minutes as needed for chest pain.       primidone (MYSOLINE) 50 MG tablet Take 250 mg by mouth at bedtime.              tamsulosin (FLOMAX) 0.4 mg cap Take 2 capsules (0.8 mg total) by mouth daily after supper. 180 capsule 3     triamterene-hydrochlorothiazide (DYAZIDE) 37.5-25 mg per capsule Take 1 capsule by mouth every morning. 90 capsule 3     ONETOUCH DELICA LANCETS 33 gauge Misc USE TO TEST ONCE DAILY 100 each 3     ONETOUCH ULTRA BLUE TEST STRIP strips USE TO TEST ONCE DAILY 50 strip 7     ONETOUCH ULTRA2 Use 1 each As Directed daily. Type 2 diabetes mellitus with complication, without long-term current use of insulin  E11.8 1 each 0     No current facility-administered medications for this visit.      Social History     Tobacco Use   Smoking Status Former Smoker     Last attempt to quit:      Years since quittin.3   Smokeless Tobacco Never Used       OBJECTICE: /64 (Patient Site: Right Arm, Patient Position: Sitting, Cuff Size: Adult Large)   Pulse (!) 54   Temp 97.6  F (36.4  C) (Oral)   Resp 16   Ht 6' (1.829 m)   Wt (!) 250 lb (113.4 kg)   SpO2 98%   BMI 33.91 kg/m       Recent Results (from the past 24  hour(s))   Glycosylated Hemoglobin A1c    Collection Time: 05/22/19  1:58 PM   Result Value Ref Range    Hemoglobin A1c 6.6 (H) 3.5 - 6.0 %        GEN-alert, appropriate, in no apparent distress   Eyes-funduscopic exam limited by the undilated pupils but looks normal bilaterally.   ENT-neck is supple with no palpable mass or lymphadenopathy.  Mucous memories are moist.   CV-regular rate and rhythm with no murmur   RESP-lungs clear to auscultation   ABDOMINAL-soft, nontender to palpation, no palpable mass organomegaly   Musculoskeletal-some mild tenderness to palpation of the right lower anterior chest wall.     Foot exam-normal.  Palpable pedal pulses bilaterally, no ulcers.   Neurologic-cranial nerves II through XII are intact other than a very subtle left corner of the mouth facial droop.  Strength and sensation are intact and symmetric.    Roland Louis

## 2021-05-29 NOTE — PROGRESS NOTES
[facility] Daily Progress     Patient Name: Tony Hoffman  Date: 2019  Visit #:  visits - [facility]/Medicare   PTA visit #:    Referral Diagnosis:   Referring provider: Roland Louis MD  Visit Diagnosis:     ICD-10-CM    1. Gait instability R26.81    2. Poor balance R26.89    3. Generalized muscle weakness M62.81          Assessment:   Patient has been seen for 6 PT visits since IE. He has made progress in reducing falls risk as TUG score decreased and gait speed increased slightly. LE strength is improving with increase from 8 reps with UE support to 9 reps without UE support. Patient is appropriate for continued PT services.     Patient is benefitting from skilled physical therapy and is making steady progress toward functional goals.  Patient is appropriate to continue with skilled physical therapy intervention, as indicated by initial plan of care.    Goal Status:  Pt. will demonstrate/verbalize independence in self-management of condition in : 2 weeks (met)  Pt. will be independent with home exercise program in : 2 weeks (met)  Pt will: reduce TUG to <13.5 seconds to decrease risk for falls in 8 weeks (met)  Pt will: ambulate at gait speed of 0.95 m/s or greater to improve gait efficiency and decrease falls risk in 8 weeks (progressing)  Pt will: perform >6 reps withOUT UE support on 30 sec chair stand to improve LE strength in 8 weeks (met)  Pt will: perform 4 square step test in <12.0 seconds without dowel strikes to improve dynamic balance in 8 weeks (progressing)    Plan / Patient Education:     Continue with initial plan of care.  Progress with home program as tolerated.    Quarter and 180 degree turns     Subjective:     Pain Ratin   Patient reports no major changes. No questions.     Objective:     30 second sit<>stand: 10 reps without UE support  (19: 8 reps)    TU.37 seconds, 12.34, 10.34, 11.63 average  (18.37 seconds)    4 square step test: 12.03 with 1 dowel strike    (12.06 with 3 dowel strikes)    Comfortable gait speed: 6.75 seconds, 0.88 m/s (0.82 m/s)  4.69 seconds 1.27 m/s (1.21 m/s)      Therapeutic exercise performed today:    Nu-step WL, 8, 9:00 total. Average SPM: 60.    Monster walk L2 band x 10 feet x 2 reps   Zig zag posterior L2 band x 10 feet x 2 reps in //, occasional UE support and CGA    Treadmill walking 1.9 MPH at 4% incline x 7:00 total. VC to increase height of steps and for increased heel strike. Pt using 1 hand on HR at times due to LOB. Added head turns for 2:00 total with one hand on treadmill, no major LOB or lateral deviation.     Neuromuscular re-education performed today:    CGA for safety:  - Forward steps on red foam to airex x 20, alternating,no major LOB, difficulty clearing foam L>R sides   - Lateral steps on red foam to airex x 20, alternating   - Backwards steps on red foam to airex pad  x 20, alternating      1/4 turns using agility dots for foot cues for big steps x 10 reps each direction, non alternating- practiced side and diagonal steps as well.   Added foams rather than agility dots using same 1/4 turns and directional steps. LOB with min A needed multiple times.       HEP:  - bridge - bridge with abduction   - TA -   - Clamshell L2 band   - supine SLR  - chair stands     Treatment Today     TREATMENT MINUTES COMMENTS   Evaluation     Self-care/ Home management     Manual therapy     Neuromuscular Re-education 30    Therapeutic Activity     Therapeutic Exercises 25    Gait training     Modality__________________                Total 55    Blank areas are intentional and mean the treatment did not include these items.       Jeni Andersen  6/6/2019

## 2021-05-29 NOTE — PROGRESS NOTES
[facility] Daily Progress     Patient Name: Tony Hoffman  Date: 2019  Visit #:  visits - [facility]/Medicare   PTA visit #:    Referral Diagnosis:   Referring provider: Roland Louis MD  Visit Diagnosis:     ICD-10-CM    1. Gait instability R26.81    2. Poor balance R26.89    3. Generalized muscle weakness M62.81          Assessment:   Patient displays lateral deviation and slight dizziness today with ambulation and head turns, but no slowing of gait or LOB. He is able to improve step length and foot clearance but discontinues with carrying on conversation. Pt will benefit from continued gait challenges and practice of quarter/180 degree turns for dynamic balance at next visit.     Patient is benefitting from skilled physical therapy and is making steady progress toward functional goals.  Patient is appropriate to continue with skilled physical therapy intervention, as indicated by initial plan of care.    Goal Status:  Pt. will demonstrate/verbalize independence in self-management of condition in : 2 weeks  Pt. will be independent with home exercise program in : 2 weeks    Pt will: reduce TUG to <13.5 seconds to decrease risk for falls in 8 weeks  Pt will: ambulate at gait speed of 0.95 m/s or greater to improve gait efficiency and decrease falls risk in 8 weeks  Pt will: perform >6 reps withOUT UE support on 30 sec chair stand to improve LE strength in 8 weeks  Pt will: perform 4 square step test in <12.0 seconds without dowel strikes to improve dynamic balance in 8 weeks      Plan / Patient Education:     Continue with initial plan of care.  Progress with home program as tolerated.    Quarter and 180 degree turns     Subjective:     Pain Ratin  Had a poor nights sleep last night, has been working on his walking and balance at home as well, but not walking as much as he should be.     Objective:     Therapeutic exercise performed today:    Nu-step WL, 8, 9:00 total. Average SPM: 59.  - Bridges      Bridges with abduction L2 band x 10 B with rest between sides   Performed with single leg kick x 3 second hold x 10 reps   - gluteal sets with feet on ball min A from therapist on ball 5 sec hold x 10 reps   - Clamshell L2 band  x 20 reps B    Monster walk L2 band x 10 feet x 2 reps   Zig zag posterior L2 band x 10 feet x 2 reps in //, occasional UE support and CGA    Neuromuscular re-education performed today:    CGA for safety:  - Forward steps on red foam to airex x 20, alternating,no major LOB, difficulty clearing foam L>R sides   - Lateral steps on red foam to airex x 20, alternating   - Backwards steps on red foam to airex pad  x 20, alternating     1/4 turns using agility dots for foot cues for big steps x 10 reps each direction, non alternating, mild instability noted when stepping back to the right using rail for support initially, balance improved with reps    1/2 turns again using dots for steps to turn x 15 reps each direction, non alternating     Gait/walking challenges:  Ambulation in hallway x 500 feet total. Cues for heel strike, posture, and reciprocal swing.  Added cognitive task of counting down from 100 by 3's, no mistake but noted more difficulty with heel strike on right and slight path deviation to right    Added head turns R/L and up/down. Dizziness reported occasionally with head turns, slight lateral deviation present R and L turns but no slowing of gait deviations or imbalance noted.   Added fast, slow or stop commands to walking and patient was able to do this without   Decreased arm swing noted on L side.     HEP:  - bridge - bridge with abduction   - TA -   - Clamshell L2 band   - supine SLR  - chair stands     Treatment Today     TREATMENT MINUTES COMMENTS   Evaluation     Self-care/ Home management     Manual therapy     Neuromuscular Re-education 30    Therapeutic Activity     Therapeutic Exercises 25    Gait training     Modality__________________                Total 55     Blank areas are intentional and mean the treatment did not include these items.       Bonny Prajapati  6/4/2019

## 2021-05-29 NOTE — PROGRESS NOTES
Order for Durable Medical Equipment was processed and equipment ordered.     DME provider: [facility]    Date Faxed: 6/20/2019    Ordering Provider: Van Melgar MD    PAP Order Type: New Device Order    Fax Number: IN HOUSE DME: [id number]

## 2021-05-29 NOTE — TELEPHONE ENCOUNTER
Vision loss/changes can be caused by a stroke.  Eye exam is good idea to see if the vision changes are due to the stroke or his DM.

## 2021-05-29 NOTE — PROGRESS NOTES
"[facility] Daily Progress     Patient Name: Tony Hoffman  Date: 2019  Visit #:  visits - [facility]/Medicare   PTA visit #:    Referral Diagnosis:   Referring provider: Roland Louis MD  Visit Diagnosis:     ICD-10-CM    1. Gait instability R26.81    2. Poor balance R26.89    3. Generalized muscle weakness M62.81          Assessment:   Patient continues to progress towards goals. He tolerated increased speed on treadmill with incline with good step length and foot clearance but fatigued quickly. Pt's biggest challenge is unstable surfaces and direction changes- with B foot neuropathy contributing to his difficulty on uneven surfaces.     Patient is benefitting from skilled physical therapy and is making steady progress toward functional goals.  Patient is appropriate to continue with skilled physical therapy intervention, as indicated by initial plan of care.    Goal Status:  Pt. will demonstrate/verbalize independence in self-management of condition in : 2 weeks (met)  Pt. will be independent with home exercise program in : 2 weeks (met)  Pt will: reduce TUG to <13.5 seconds to decrease risk for falls in 8 weeks (met)  Pt will: ambulate at gait speed of 0.95 m/s or greater to improve gait efficiency and decrease falls risk in 8 weeks (progressing)  Pt will: perform >6 reps withOUT UE support on 30 sec chair stand to improve LE strength in 8 weeks (met)  Pt will: perform 4 square step test in <12.0 seconds without dowel strikes to improve dynamic balance in 8 weeks (progressing)    Plan / Patient Education:     Continue with initial plan of care.  Progress with home program as tolerated.    Quarter and 180 degree turns     Subjective:     Pain Ratin   Patient reports he went to the gym and used the treadmill at 2.7 mph 0.5% incline. Was feeling good with this and used his hands on the treadmill. Continues to feel \"off balance\".     Objective:     Therapeutic exercise performed today:    Nu-step " "WL, 8, 8:00 total. Average SPM: 60.    Single leg bridge x 5\" x 10 reps   Glut raise on swiss ball   Clamshell L4 band x 20 B  Monster walk L4 band x 10 feet x 4 reps both ways- no LOB, cues to keep squat with side steps.     Treadmill walking 2.5 MPH at 4% incline x 5:00 total. Increased step length with increased pace of walking today. Using B UE support.     Neuromuscular re-education performed today:    Foam steps (diagonal, sideways, forward/backwrads) rather than agility dots using same 1/4 turns and directional steps. LOB with min A needed multiple times.     Step up onto 6\" step without UE support, cues to \"drive knee\" x 10 B, no LOB.    SLS with toe on ball x 30 seconds x 3 reps without UE support, VC to weight shift forward as pt tends to lean backwards  Toe taps without UE touching toe to ball, VC for slow controlled movement x 10 B  1/2 foam stand x 60 seconds x 2 with cues again for weight shift forward    Balance exercises:  - Standing on a pillow with feet together and eyes closed. Hold x 30 seconds, 2-3 times.   - Standing on one leg x 30 seconds, 2-3 times each leg  - Tandem stance (heel to toe stand)  o Add head turns with left/center, right/center, up/center, down/center  o X 30, 2-3 times with both legs in front/behind   - Cross overs: right over left, left over right with BIG steps (counter top nearby for support if needed)    HEP:  - bridge - bridge with abduction   - TA -   - Clamshell L2 band   - supine SLR  - chair stands   - balance ex's  - treadmill at the gym     Treatment Today     TREATMENT MINUTES COMMENTS   Evaluation     Self-care/ Home management     Manual therapy     Neuromuscular Re-education 30    Therapeutic Activity     Therapeutic Exercises 25    Gait training     Modality__________________                Total 55    Blank areas are intentional and mean the treatment did not include these items.       Jeni Andersen  6/11/2019  "

## 2021-05-29 NOTE — PROGRESS NOTES
"[facility] Daily Progress     Patient Name: Tony Hoffman  Date: 2019  Visit #:  visits - [facility]/Medicare   PTA visit #:    Referral Diagnosis:   Referring provider: Roland Louis MD  Visit Diagnosis:     ICD-10-CM    1. Gait instability R26.81    2. Poor balance R26.89    3. Generalized muscle weakness M62.81          Assessment:   Pt's biggest challenge is unstable surfaces and direction changes- with B foot neuropathy contributing to his difficulty on uneven surfaces but does improve with repetitions throughout the session.     Patient is benefitting from skilled physical therapy and is making steady progress toward functional goals.  Patient is appropriate to continue with skilled physical therapy intervention, as indicated by initial plan of care.    Goal Status:  Pt. will demonstrate/verbalize independence in self-management of condition in : 2 weeks (met)  Pt. will be independent with home exercise program in : 2 weeks (met)  Pt will: reduce TUG to <13.5 seconds to decrease risk for falls in 8 weeks (met)  Pt will: ambulate at gait speed of 0.95 m/s or greater to improve gait efficiency and decrease falls risk in 8 weeks (progressing)  Pt will: perform >6 reps withOUT UE support on 30 sec chair stand to improve LE strength in 8 weeks (met)  Pt will: perform 4 square step test in <12.0 seconds without dowel strikes to improve dynamic balance in 8 weeks (progressing)    Plan / Patient Education:     Continue with initial plan of care.  Progress with home program as tolerated.    Quarter and 180 degree turns     Subjective:     Pain Ratin   He has been fatigued this weekend and it has prevented him from going to the gym. He has follow up with the sleep MD on Thursday and then he sees the cardiologist tomorrow.     Objective:     Therapeutic exercise performed today:    Nu-step WL, 8, 10:00 total. Average SPM: 61 .    - Step ups onto 6\" step into high knee march x 12 reps each side, non " "alternating, used railing as needed for balance, CGA from therapist x 2    - lateral step ups on to 6\" step x 12 reps each way, again using rails for balance as needed.     - sit to stands from standard chair with airex pad under x 12 reps, CGA from therapist as needed    - seated fig 4 stretch 30 sec x 3 reps each side     - eccentric step downs from 3 in book using rails for balance as needed x 12 reps each side       Neuromuscular re-education performed today:    Hip hinges: placing airex pad under right side to shift weight to left side x 15 reps      Shoulder taps: cues for straight movement as patient tends to rotate to right x 15 reps      Tandem stance on 1/2 foam roll eyes open 2 x 60 sec each way using rails and CGA as needed     Walking in hallways  - big steps and arm swing, added in 1/4 to 1/2 turns in hallways  - added cognitive task of naming boys names alphabetically with same focus as above  - calling out, fast, stop or turn for change in speed, direction, and sudden stops  - head turns calling out up, left or right     Balance exercises:  - Standing on a pillow with feet together and eyes closed. Hold x 30 seconds, 2-3 times.   - Standing on one leg x 30 seconds, 2-3 times each leg  - Tandem stance (heel to toe stand)  o Add head turns with left/center, right/center, up/center, down/center  o X 30, 2-3 times with both legs in front/behind   - Cross overs: right over left, left over right with BIG steps (counter top nearby for support if needed)    HEP:  - bridge - bridge with abduction   - TA -   - Clamshell L2 band   - supine SLR  - chair stands   - balance ex's  - treadmill at the gym     Treatment Today     TREATMENT MINUTES COMMENTS   Evaluation     Self-care/ Home management     Manual therapy     Neuromuscular Re-education 30    Therapeutic Activity     Therapeutic Exercises 25    Gait training     Modality__________________                Total 55    Blank areas are intentional and mean the " treatment did not include these items.       Bonny Prajapati  6/18/2019

## 2021-05-29 NOTE — PROGRESS NOTES
[facility] Daily Progress     Patient Name: Tony Hoffman  Date: 2019  Visit #: 2 visits - [facility]/Medicare   PTA visit #:    Referral Diagnosis:   Referring provider: Roland Louis MD  Visit Diagnosis:     ICD-10-CM    1. Gait instability R26.81    2. Poor balance R26.89    3. Generalized muscle weakness M62.81          Assessment:   Patient returns to first PT follow up. HEP was initiated today. Pt was fatigued at the end of session; however is motivated during visit.     Patient is benefitting from skilled physical therapy and is making steady progress toward functional goals.  Patient is appropriate to continue with skilled physical therapy intervention, as indicated by initial plan of care.    Goal Status:  Pt. will demonstrate/verbalize independence in self-management of condition in : 2 weeks  Pt. will be independent with home exercise program in : 2 weeks    Pt will: reduce TUG to <13.5 seconds to decrease risk for falls in 8 weeks  Pt will: ambulate at gait speed of 0.95 m/s or greater to improve gait efficiency and decrease falls risk in 8 weeks  Pt will: perform >6 reps withOUT UE support on 30 sec chair stand to improve LE strength in 8 weeks  Pt will: perform 4 square step test in <12.0 seconds without dowel strikes to improve dynamic balance in 8 weeks      Plan / Patient Education:     Continue with initial plan of care.  Progress with home program as tolerated.    Subjective:   Patient reports no changes no questions today.     Pain Ratin      Objective:     Therapeutic exercise performed today:    Nu-step WL, 6, 8:00 total.     HEP initiated today:  - Bridges x 5 seconds x 15 reps   - TA sets- with breathing, difficulty performing both x 5 seconds x 15 reps  - SLR with quad set x 10 reps    -Clamshell no resistance x 10     Other:  - Glut raises 2 x 10 with PT holding ball   -  Sidelying SLR x 10 B with cues for hip placement      Neuromuscular re-education performed  today:    CGA for safety:  Forward steps on red foam x 20, alternating,no major LOB, difficulty clearing foam L>R sides  Lateral steps on red foam x 20, alternating   Backwards steps on red foam x 20, alternating     Sit>stand x 10 without foam, without UE support    Added yellow foam x 10 reps- min A needed x 2 due to LOB posteriorly    Other balance:  - NBOS airex EC 3 x 30 seconds; frequent LOB  - NBOS with head turns up/down right/left- no LOB; moderate instability present        HEP:  - bridge   - TA   - Clamshell  - supine SLR  - chair stands     Treatment Today     TREATMENT MINUTES COMMENTS   Evaluation     Self-care/ Home management     Manual therapy     Neuromuscular Re-education 25    Therapeutic Activity     Therapeutic Exercises 30    Gait training     Modality__________________                Total 55    Blank areas are intentional and mean the treatment did not include these items.       Jeni Andersen  5/23/2019

## 2021-05-29 NOTE — PROGRESS NOTES
"[facility] Daily Progress     Patient Name: Tony Hoffman  Date: 2019  Visit #:  visits - [facility]/Medicare   PTA visit #:    Referral Diagnosis:   Referring provider: Roland Louis MD  Visit Diagnosis:     ICD-10-CM    1. Gait instability R26.81    2. Poor balance R26.89    3. Generalized muscle weakness M62.81          Assessment:   Pt's biggest challenge is unstable surfaces and direction changes- with B foot neuropathy contributing to his difficulty on uneven surfaces but does improve with repetitions throughout the session.     Patient is benefitting from skilled physical therapy and is making steady progress toward functional goals.  Patient is appropriate to continue with skilled physical therapy intervention, as indicated by initial plan of care.    Goal Status:  Pt. will demonstrate/verbalize independence in self-management of condition in : 2 weeks (met)  Pt. will be independent with home exercise program in : 2 weeks (met)  Pt will: reduce TUG to <13.5 seconds to decrease risk for falls in 8 weeks (met)  Pt will: ambulate at gait speed of 0.95 m/s or greater to improve gait efficiency and decrease falls risk in 8 weeks (progressing)  Pt will: perform >6 reps withOUT UE support on 30 sec chair stand to improve LE strength in 8 weeks (met)  Pt will: perform 4 square step test in <12.0 seconds without dowel strikes to improve dynamic balance in 8 weeks (progressing)    Plan / Patient Education:     Continue with initial plan of care.  Progress with home program as tolerated.    Quarter and 180 degree turns     Subjective:     Pain Ratin   Has been busy at home but has not done as much exercises at home because of this.  Continues to feel \"off balance\" and weak at times    Objective:     Therapeutic exercise performed today:    Nu-step WL, 8, 8:00 total. Average SPM: 61 .    - Step ups onto 6\" step into high knee march x 12 reps each side, non alternating, used railing as needed for " "balance, CGA from therapist x 2    - lateral step ups on to 6\" step x 12 reps each way, again using rails for balance as needed.       Neuromuscular re-education performed today:    Forward steps from green to red foam alternating steps x 20 reps SBA from therapist minimal use of rails from support     1/4 turn steps from red to green foam and back x 15 reps each side SBA from therapist and use of rails as needed.     Standing on red foam cone toe tapping alternating sides with CGA x 20 reps from therapist   Standing on red form cone tapping opposite side x 20 reps alternating legs     Walking in hallways  - big steps and arm swing, added in 1/4 to 1/2 turns in hallways  - added cognitive task of naming boys names alphabetically with same focus as above  - calling out, fast, stop or turn for change in speed, direction, and sudden stops  - head turns calling out up, left or right     Balance exercises:  - Standing on a pillow with feet together and eyes closed. Hold x 30 seconds, 2-3 times.   - Standing on one leg x 30 seconds, 2-3 times each leg  - Tandem stance (heel to toe stand)  o Add head turns with left/center, right/center, up/center, down/center  o X 30, 2-3 times with both legs in front/behind   - Cross overs: right over left, left over right with BIG steps (counter top nearby for support if needed)    HEP:  - bridge - bridge with abduction   - TA -   - Clamshell L2 band   - supine SLR  - chair stands   - balance ex's  - treadmill at the gym     Treatment Today     TREATMENT MINUTES COMMENTS   Evaluation     Self-care/ Home management     Manual therapy     Neuromuscular Re-education 23    Therapeutic Activity     Therapeutic Exercises 30    Gait training     Modality__________________                Total 53    Blank areas are intentional and mean the treatment did not include these items.       Bonny Prajapati  6/13/2019  "

## 2021-05-29 NOTE — PROGRESS NOTES
Sleep Study Review Visit  He is a 74 y.o. y/o male patient who comes to the sleep medicine clinic for sleep study review.    We had an extensive conversation to review the results of his sleep study.  The overnight polysomnography was completed with a digital sleep system using the international 10-20 electrode placement for recording EEG, EOG, EMG from chin, ECG, respiratory effort, oximetry, body position, airflow, nasal pressure, snoring sound, pulse rate and limb movement channels.  The study was completed as a diagnostic study.  We reviewed the oxygen saturation graph as well as the result tables from the report.    Despite his anxiety, patient felt CPAP was relaxing during pre-study trial.  Study demonstrated moderate CHEYANNE (AHI 26.5/hr) with hypoxia related to CHEYANNE.  Events were more frequent while supine and he prefers supine sleep position.  He felt he slept ok during study.      Physical Exam:  /72   Pulse 69   Ht 6' (1.829 m)   Wt (!) 254 lb (115.2 kg)   SpO2 95%   BMI 34.45 kg/m    General appearance: No apparent distress, well groomed.  Head: Normocephalic, atraumatic.  Musculoskeletal: No edema noted.  No clubbing or cyanosis.  Skin: Warm, dry, intact.  Neurologic: Alert and oriented to person, place and time   Gait is normal.  Psychiatric: Affect pleasant.  Mood good.     Labs/Studies:  - We reviewed the results of the overnight PSG as described on the HPI.     Assessment and Plan:  1. CHEYANNE (obstructive sleep apnea)  In summary Tony Hoffman is a 74 y.o. year old male here for study results.  We had an extensive conversation to review the results of his sleep study and to  him on the importance of treating sleep apnea.   We discussed treatment options including CPAP    I reviewed the diagnosis of obstructive sleep apnea with patient.  We discussed consequences of untreated Obstructive Sleep Apnea, such as markedly elevated risk for heart attack or stroke, and benefits of treatment.  He is  interested in starting treatment of CHEYANNE with PAP therapy.  Reviewed importance of nightly therapy for effective treatment of CHEYANNE, and he voiced understanding and agreement.  We also reviewed importance of using PAP during the entire sleep duration to achieve maximal benefits, but reviewed that a minimum of 4 hours per night was required.  He is confident that he can achieve these goals and is willing to start therapy as soon as possible.    He will be seen back approximately 2 months after starting PAP therapy to ensure compliance and review treatment outcomes.  However, if he develops any difficulties with treatment he is instructed to contact us or [employer] immediately to troubleshoot problems.      2. Cerebrovascular accident (CVA), unspecified mechanism (H)  We discussed the cardiovascular event risk associated with untreated CHEYANNE, specifically in terms of higher rates of strokes and heart attacks among patients with untreated moderate to severe CHEYANNE  ([facility]).     Patient verbalized understanding of these issues, agrees with the plan and all questions were answered today. Patient was given an opportuntity to voice any other symptoms or concerns not listed above. Patient did not have any other symptoms or concerns.      Patient told to return in 2 months. Patient instructed to stop at  to schedule appointment before leaving today.    Van Melgar MD  [facility] Board Certified in Internal Medicine and Sleep Medicine  [facility].    We spent a total of 15 minutes of face-to-face encounter and more than 50% of the encounter was used for counseling or coordination of care.

## 2021-05-29 NOTE — PROGRESS NOTES
[facility] Daily Progress     Patient Name: Tony Hoffman  Date: 2019  Visit #:  visits - [facility]/Medicare   PTA visit #:    Referral Diagnosis:   Referring provider: Roland Louis MD  Visit Diagnosis:     ICD-10-CM    1. Gait instability R26.81    2. Poor balance R26.89    3. Generalized muscle weakness M62.81          Assessment:   Patient displays lateral deviation and slight dizziness today with ambulation and head turns, but no slowing of gait or LOB. He is able to improve step length and foot clearance but discontinues with carrying on conversation. Pt will benefit from continued gait challenges and practice of quarter/180 degree turns for dynamic balance at next visit.     Patient is benefitting from skilled physical therapy and is making steady progress toward functional goals.  Patient is appropriate to continue with skilled physical therapy intervention, as indicated by initial plan of care.    Goal Status:  Pt. will demonstrate/verbalize independence in self-management of condition in : 2 weeks  Pt. will be independent with home exercise program in : 2 weeks    Pt will: reduce TUG to <13.5 seconds to decrease risk for falls in 8 weeks  Pt will: ambulate at gait speed of 0.95 m/s or greater to improve gait efficiency and decrease falls risk in 8 weeks  Pt will: perform >6 reps withOUT UE support on 30 sec chair stand to improve LE strength in 8 weeks  Pt will: perform 4 square step test in <12.0 seconds without dowel strikes to improve dynamic balance in 8 weeks      Plan / Patient Education:     Continue with initial plan of care.  Progress with home program as tolerated.   Quarter and 180 degree turns     Subjective:   Patient reports no changes, no questions today.     Pain Ratin      Objective:     Therapeutic exercise performed today:    Nu-step WL, 8, 8:00 total. SPM: 60.  - Bridges x 5 seconds x 15 reps     Bridges with abduction L2 band x 10 B with rest between  sides   Performed with single leg kick x 3 second hold x 10 reps   - TA sets- with slow alternating marches 3 x 10 reps.  - Clamshell L2 band  x 20 reps B    Monster walk L2 band x 10 feet x 2 reps   Zig zag posterior L2 band x 10 feet x 2 reps in //, occasional UE support and CGA    Neuromuscular re-education performed today:    CGA for safety:  - Forward steps on red foam to green x 20, alternating,no major LOB, difficulty clearing foam L>R sides   - Lateral steps on red foam to green x 20, alternating   - Backwards steps on red foam to green  x 20, alternating     Gait/walking challenges:  Ambulation in hallway x 500 feet total. Cues for heel strike, posture, and reciprocal swing. Added head turns R/L and up/down. Dizziness reported occasionally with head turns, slight lateral deviation present R and L turns but no slowing of gait.   Decreased arm swing noted on L side.     HEP:  - bridge - bridge with abduction   - TA -   - Clamshell L2 band   - supine SLR  - chair stands     Treatment Today     TREATMENT MINUTES COMMENTS   Evaluation     Self-care/ Home management     Manual therapy     Neuromuscular Re-education 25    Therapeutic Activity     Therapeutic Exercises 30    Gait training     Modality__________________                Total 55    Blank areas are intentional and mean the treatment did not include these items.       Jeni Andersen  5/31/2019

## 2021-05-30 VITALS — HEIGHT: 72 IN | BODY MASS INDEX: 34.95 KG/M2 | WEIGHT: 258.08 LBS

## 2021-05-30 NOTE — PROGRESS NOTES
[facility] Daily Progress     Patient Name: Tony Hoffman  Date: 2019  Visit #:  visits - [facility]/Medicare   PTA visit #:    Referral Diagnosis:   Referring provider: Roland Louis MD  Visit Diagnosis:     ICD-10-CM    1. Gait instability R26.81    2. Poor balance R26.89    3. Generalized muscle weakness M62.81          Assessment:   Patient is demonstrating improved balance with reps on uneven surfaces and improved step clearance and able to maintain with distraction.   Patient is benefitting from skilled physical therapy and is making steady progress toward functional goals.  Patient is appropriate to continue with skilled physical therapy intervention, as indicated by initial plan of care.    Goal Status:  Pt. will demonstrate/verbalize independence in self-management of condition in : 2 weeks (met)  Pt. will be independent with home exercise program in : 2 weeks (met)  Pt will: reduce TUG to <13.5 seconds to decrease risk for falls in 8 weeks (met)  Pt will: ambulate at gait speed of 0.95 m/s or greater to improve gait efficiency and decrease falls risk in 8 weeks (progressing)  Pt will: perform >6 reps withOUT UE support on 30 sec chair stand to improve LE strength in 8 weeks (met)  Pt will: perform 4 square step test in <12.0 seconds without dowel strikes to improve dynamic balance in 8 weeks (progressing)    Plan / Patient Education:     Continue with initial plan of care.  Progress with home program as tolerated.    Quarter and 180 degree turns     Subjective:     Pain Ratin   He finally felt he had a good nights sleep last night. He will be getting a machine for his sleep apnea next week. He has not been to the gym since the last session but has been walking a lot more, went [place] [employer] [place] and [place] [employer] [place]. His legs were shakey when he was done here and after walking at those events.     Objective:     Therapeutic exercise performed today:    Nu-step WL, 8, 10:00 total.  "Average SPM: 59  trying to use the legs more, less arm work.     - Step ups onto 6\" step into high knee march x 12 reps each side, non alternating, used railing as needed for balance, CGA from therapist x 2    - lateral step ups on to 6\" step x 12 reps each way, again using rails for balance as needed.     - sit to stands from standard chair with BOSU under x 12 reps, CGA to min A from therapist and use of B UEs     - eccentric step downs from 3 in book using rails for balance as needed x 12 reps each side       Neuromuscular re-education performed today:  Forward step ups on BOSU, alternating sides x 20 reps with min A from therapist for balance, cues to weight shift over feet     Mini knee squats on BOSU with 5 sec hold x 12 reps     Standing on yellow foam and stepping 1/4 turn steps to green foam to right and back then red foam to left and back alternating sides x 20 reps     Hip hinges: placing yellow foam pad under both feet x 15 reps, moved further away again.     Shoulder taps standing on yellow foam pad: cues for straight movement as patient tends to rotate to right x 15 reps      Alternating unilateral shoulder taps standing on yellow foam pad x 20 reps, increased difficulty with balance turning to the left     Walking in hallways  - big steps and arm swing, added in 1/4 to 1/2 turns in hallways  - walking high step march, side steps and backwards walking   Balance exercises:  - Standing on a pillow with feet together and eyes closed. Hold x 30 seconds, 2-3 times.   - Standing on one leg x 30 seconds, 2-3 times each leg  - Tandem stance (heel to toe stand)  o Add head turns with left/center, right/center, up/center, down/center  o X 30, 2-3 times with both legs in front/behind   - Cross overs: right over left, left over right with BIG steps (counter top nearby for support if needed)    HEP:  - bridge - bridge with abduction   - TA -   - Clamshell L2 band   - supine SLR  - chair stands   - balance ex's  - " treadmill at the gym     Treatment Today     TREATMENT MINUTES COMMENTS   Evaluation     Self-care/ Home management     Manual therapy     Neuromuscular Re-education 30    Therapeutic Activity     Therapeutic Exercises 25    Gait training     Modality__________________                Total 55    Blank areas are intentional and mean the treatment did not include these items.       Bonny Prajapati  6/25/2019

## 2021-05-30 NOTE — PROGRESS NOTES
[facility] Discharge Summary  Patient Name: Tony Hoffman  Date: 7/11/2019   Referral Diagnosis: CVA  Referring provider: Roland Louis MD  Visit Diagnosis:   1. Gait instability     2. Poor balance     3. Generalized muscle weakness         Goals:  Pt. will demonstrate/verbalize independence in self-management of condition in : 2 weeks  Pt. will be independent with home exercise program in : 2 weeks    Pt will: reduce TUG to <13.5 seconds to decrease risk for falls in 8 weeks  Pt will: ambulate at gait speed of 0.95 m/s or greater to improve gait efficiency and decrease falls risk in 8 weeks  Pt will: perform >6 reps withOUT UE support on 30 sec chair stand to improve LE strength in 8 weeks  Pt will: perform 4 square step test in <12.0 seconds without dowel strikes to improve dynamic balance in 8 weeks      Patient was seen for 12 visits from 5/16/19 to 6/27119 with 0 missed appointments.  Patient has passed away and the chart will now be discharged.     Therapy will be discontinued at this time.  The patient will need a new referral to resume.    Thank you for your referral.  Bonny Prajapati  7/11/2019  10:58 AM    [facility] Re-Certification Request    June 27, 2019      Patient: Tony Hoffman  MR Number: [id number]  YOB: 1945  Date of Visit: 6/27/2019  Onset Date:  5/16/19  Date of Eval: 5/16/19    Dear Dr. Harris:    As you may recall, we have been seeing Tony Hoffman for Physical Therapy.    For therapy to continue, Medicare and/or Medicaid requires periodic physician review of the treatment plan. Please review the summary of the patient's progress and our plan for continued therapy, and verify  that you agree therapy should continue by entering certification dates at the bottom of this note and co-signing this note.    Plan of Care  No data recorded    Goal  Pt. will demonstrate/verbalize independence in self-management of condition in : 2 weeks  Pt. will be independent with home  exercise program in : 2 weeks    Pt will: reduce TUG to <13.5 seconds to decrease risk for falls in 8 weeks  Pt will: ambulate at gait speed of 0.95 m/s or greater to improve gait efficiency and decrease falls risk in 8 weeks  Pt will: perform >6 reps withOUT UE support on 30 sec chair stand to improve LE strength in 8 weeks  Pt will: perform 4 square step test in <12.0 seconds without dowel strikes to improve dynamic balance in 8 weeks        If you have any questions or concerns, please don't hesitate to call.    Sincerely,      Bonny Prajapati, PT        Physician recommendation:     ___ Follow therapist's recommendation        ___ Modify therapy      *Physician co-signature indicates they certify the need for these services furnished within this plan and while under their care.      [facility] Daily Progress     Patient Name: Tony Hoffman  Date: 6/27/2019  Visit #: 12/12 visits - [facility]/Medicare   PTA visit #:    Referral Diagnosis:   Referring provider: Roland Louis MD  Visit Diagnosis:     ICD-10-CM    1. Gait instability R26.81    2. Poor balance R26.89    3. Generalized muscle weakness M62.81          Assessment:   Patient is demonstrating improved balance with reps on uneven surfaces and improved step clearance and able to maintain with distraction.   Patient is benefitting from skilled physical therapy and is making steady progress toward functional goals.  Patient is appropriate to continue with skilled physical therapy intervention, as indicated by initial plan of care.    Goal Status:  Pt. will demonstrate/verbalize independence in self-management of condition in : 2 weeks (met)  Pt. will be independent with home exercise program in : 2 weeks (met)  Pt will: reduce TUG to <13.5 seconds to decrease risk for falls in 8 weeks (met)  Pt will: ambulate at gait speed of 0.95 m/s or greater to improve gait efficiency and decrease falls risk in 8 weeks (progressing)  Pt will: perform >6 reps withOUT  UE support on 30 sec chair stand to improve LE strength in 8 weeks (met)  Pt will: perform 4 square step test in <12.0 seconds without dowel strikes to improve dynamic balance in 8 weeks (progressing)    Plan / Patient Education:     Continue with initial plan of care.  Progress with home program as tolerated.    Quarter and 180 degree turns     Subjective:     Pain Ratin   He finally felt he had a good nights sleep last night. He will be getting a machine for his sleep apnea next week. He has not been to the gym since the last session but has been walking a lot more, went [place] the [employer] market and the [employer]. His legs were shakey when he was done here and after walking at those events.     Objective:      30 second sit<>stand: 9 reps without UE support  (19: 8 reps; 10 reps at 6th visit)    TUG: 10.41 seconds, 8.84 seconds, 8.88 seconds,   Average 9.38 seconds   (15.78 seconds at initial and 11.63 average at the 6th visit)    TUG Cognitive: 10.81 seconds  Task: count down from 100 by 3's (100-84 (one mistake)   18.37 seconds  Task: counting down from 100 by 3's. Hesitates, makes to 97.        4 square step test: 11:68 seconds 2 strikes; 10.06 seconds 0 dowel strikes  At 6th visit - 12.03 with 1 dowel strike   At initial - 12.06 with 3 dowel strikes     Comfortable gait speed: 5.72 seconds; 1.05 m/s  At 6th visit - 6.75 seconds, 0.88 m/s (0.82 m/s)  At initial - 4.69 seconds 1.27 m/s (1.21 m/s)    Two Minute Walk Test  Total meters walked: 134 meters   Initial - 124.6 meters  AD used? none  Age gender norm: 172.4 meters    Therapeutic exercise performed today:  Testing as above     Nu-step WL, 8, 10:00 total. Average SPM: 59  trying to use the legs more, less arm work.     - sit to stands from standard chair with BOSU under x 12 reps, CGA to min A from therapist and use of B UEs     - eccentric step downs from 3 in book using rails for balance as needed x 12 reps each side       Neuromuscular  re-education performed today:    Testing as above     Forward step ups on BOSU, alternating sides x 20 reps with min A from therapist for balance, cues to weight shift over feet     Mini knee squats on BOSU with 5 sec hold x 12 reps     Forward step ups on BOSU non alternating steps x 10 reps each way     Standing on green foam tossing yellow weighted ball x x 25 reps     Walking in hallways  - big steps and arm swing, added in 1/4 to 1/2 turns in hallways  - walking high step march, side steps and backwards walking   Balance exercises:  - Standing on a pillow with feet together and eyes closed. Hold x 30 seconds, 2-3 times.   - Standing on one leg x 30 seconds, 2-3 times each leg  - Tandem stance (heel to toe stand)  o Add head turns with left/center, right/center, up/center, down/center  o X 30, 2-3 times with both legs in front/behind   - Cross overs: right over left, left over right with BIG steps (counter top nearby for support if needed)    HEP:  - bridge - bridge with abduction   - TA -   - Clamshell L2 band   - supine SLR  - chair stands   - balance ex's  - treadmill at the gym     Treatment Today     TREATMENT MINUTES COMMENTS   Evaluation     Self-care/ Home management     Manual therapy     Neuromuscular Re-education 30    Therapeutic Activity     Therapeutic Exercises 25    Gait training     Modality__________________                Total 55    Blank areas are intentional and mean the treatment did not include these items.       Bonny Prajapati  6/27/2019

## 2021-05-30 NOTE — TELEPHONE ENCOUNTER
Last seen in clinic 5/22/19.    Attempted call #1 to home 12:09 6/25/19.    ______________________________________    Patient says he had a stroke in January 2019.  SInce then has found out he has had several strokes.  Seeing eye doctor soon.    He agrees to call back to triage if he has any symtoms of illness, reduced vision, headache or if he has additional questions. He says the change is mild and should be able to be correcte with getting new glasses.    Cristal Thompson, RN, [facility] Nurse Triage/Med Refills RN     Reason for Disposition    Blurred vision or visual changes and gradual onset (e.g., weeks, months)    Protocols used: VISION LOSS OR CHANGE-A-OH

## 2021-05-31 VITALS
HEIGHT: 72 IN | BODY MASS INDEX: 35.33 KG/M2 | BODY MASS INDEX: 35.33 KG/M2 | WEIGHT: 260.8 LBS | HEIGHT: 72 IN | WEIGHT: 260.8 LBS

## 2021-05-31 VITALS — HEIGHT: 72 IN | BODY MASS INDEX: 35.37 KG/M2

## 2021-06-01 ENCOUNTER — RECORDS - HEALTHEAST (OUTPATIENT)
Dept: ADMINISTRATIVE | Facility: CLINIC | Age: 76
End: 2021-06-01

## 2021-06-01 VITALS — BODY MASS INDEX: 34.27 KG/M2 | WEIGHT: 253 LBS | HEIGHT: 72 IN

## 2021-06-02 VITALS — BODY MASS INDEX: 34.27 KG/M2 | HEIGHT: 72 IN | WEIGHT: 253 LBS

## 2021-06-02 VITALS — HEIGHT: 72 IN | WEIGHT: 259 LBS | BODY MASS INDEX: 35.08 KG/M2

## 2021-06-03 VITALS — HEIGHT: 72 IN | BODY MASS INDEX: 34.4 KG/M2 | WEIGHT: 254 LBS

## 2021-06-03 VITALS — HEIGHT: 72 IN | WEIGHT: 250.4 LBS | BODY MASS INDEX: 33.92 KG/M2

## 2021-06-03 VITALS — BODY MASS INDEX: 35.08 KG/M2 | HEIGHT: 72 IN | WEIGHT: 259 LBS

## 2021-06-03 VITALS — BODY MASS INDEX: 33.86 KG/M2 | WEIGHT: 250 LBS | HEIGHT: 72 IN

## 2021-06-10 NOTE — ANESTHESIA PREPROCEDURE EVALUATION
Anesthesia Evaluation      Patient summary reviewed   No history of anesthetic complications     Airway   Mallampati: II   Pulmonary - normal exam   (+) a smoker                         Cardiovascular   Exercise tolerance: > or = 4 METS  (+) hypertension well controlled, CAD, ,     ECG reviewed  Rhythm: regular  Rate: normal,      ROS comment: Completely occluded LAD     Neuro/Psych    (+) neuromuscular disease,  anxiety/panic attacks,   (-) no seizures    Comments: Peripheral neuropathy    Endo/Other    (+) diabetes mellitus type 2 poorly controlled, hypothyroidism, obesity,      GI/Hepatic/Renal - negative ROS           Dental    (+) upper dentures                       Anesthesia Plan  Planned anesthetic: peripheral nerve block and spinal  Discussed SAB with AC and selective tibial nerve block. Pt understands risks of bleeding, infection, nerve damage, LAST, headache and failure  ASA 3     Anesthetic plan and risks discussed with: patient    Post-op plan: routine recovery

## 2021-06-10 NOTE — ANESTHESIA PREPROCEDURE EVALUATION
Anesthesia Evaluation      Patient summary reviewed   No history of anesthetic complications     Airway   Mallampati: II   Pulmonary - negative ROS                          Cardiovascular - normal exam  (+) hypertension, CAD, ,      Neuro/Psych - negative ROS     Endo/Other    (+) diabetes mellitus, hypothyroidism,      GI/Hepatic/Renal - negative ROS           Dental    (+) poor dentition                       Anesthesia Plan  Planned anesthetic: spinal and peripheral nerve block    ASA 3     Anesthetic plan and risks discussed with: patient            Garry Vargas

## 2021-06-10 NOTE — PROGRESS NOTES
Assessment/Plan:      Visit for Preoperative Exam.    Degenerative joint disease left knee  Hypothyroidism  Diabetes mellitus  Arteriosclerotic coronary vessel disease  Hypertension    Patient approved for surgery with general or local anesthesia. Postoperative pain to be managed by surgeon during post-operative [employer] timeframe, typically 30-60 days for major surgery, and less for others. Postoperative Care will be managed by Hospital Service. Labs will be done as indicated. Copy of the pre-op was given to the patient to bring along on the day of surgery. Follow up as needed. Proceed with proposed surgery without additional clinical clarifications. Low Risk Surgery. No active cardiac conditions. BMP, hemogram and EKG are done for this procedure.  Results will be faxed to surgery.  The patient has seen cardiology and has been cleared for surgery.  There are no contraindications to this procedure per my examination today.    Subjective:   The patient has been having significant pain in his left knee.  He has noticed a lot of swelling as well.  He is now scheduled for a total knee arthroplasty.    Scheduled Procedure: Left total knee arthroplasty  Surgery Date: 5/16/2017  Surgery Location: [facility]  Surgeon: Dr. Stevens    Current Outpatient Prescriptions   Medication Sig Dispense Refill     primidone (MYSOLINE) 50 MG tablet Take 400 mg by mouth at bedtime. Take 2 tablets (100mg) at bedtime        amLODIPine (NORVASC) 10 MG tablet TAKE ONE TABLET EVERY  tablet 0     atorvastatin (LIPITOR) 20 MG tablet TAKE ONE TABLET IN THE MORNING 150 tablet 0     blood glucose test (ONETOUCH ULTRA TEST) strips USE TO TEST ONCE DAILY 150 each 0     coenzyme Q10 200 mg capsule Take 200 mg by mouth daily.       cyanocobalamin 1000 MCG tablet Take 1,000 mcg by mouth daily.       docoshexanoic acid-eicosapent 500 mg (FISH OIL) 500-100 mg cap capsule Take 500 mg by mouth daily.        finasteride (PROSCAR)  5 mg tablet        gabapentin (NEURONTIN) 300 MG capsule Take 1 capsule (300 mg total) by mouth 3 (three) times a day. 270 capsule 3     glucosamine sulfate 500 mg cap Take 500 mg by mouth daily.        ibuprofen (ADVIL,MOTRIN) 200 MG tablet Take 600 mg by mouth every 6 (six) hours as needed for pain.       lancets (ONETOUCH DELICA LANCETS) 33 gauge Misc USE TO TEST ONCE DAILY 150 each 3     levothyroxine (SYNTHROID, LEVOTHROID) 100 MCG tablet TAKE ONE TABLET EVERY  tablet 0     metFORMIN (GLUCOPHAGE) 1000 MG tablet TAKE ONE TABLET TWICE DAILY WITH MEALS 300 tablet 0     metoprolol succinate (TOPROL-XL) 100 MG 24 hr tablet TAKE ONE TABLET EVERY  tablet 0     multivitamin with minerals (THERA-M) 9 mg iron-400 mcg Tab tablet Take 1 tablet by mouth daily.       nitroglycerin (NITROSTAT) 0.4 MG SL tablet Place 0.4 mg under the tongue every 5 (five) minutes as needed for chest pain.       ONETOUCH ULTRA2 kit        sildenafil (VIAGRA) 100 MG tablet Take 100 mg by mouth daily as needed for erectile dysfunction.       tamsulosin (FLOMAX) 0.4 mg Cp24 TAKE ONE CAPSULE AT BEDTIME 150 capsule 0     triamterene-hydrochlorothiazide (DYAZIDE) 37.5-25 mg per capsule TAKE ONE CAPSULE IN THE MORNING 150 capsule 0     No current facility-administered medications for this visit.        Allergies   Allergen Reactions     Lisinopril Cough     Wellbutrin [Bupropion Hcl] Cough       Immunization History   Administered Date(s) Administered     Hep A, historic 07/26/2011     Influenza high dose, seasonal 11/03/2015     Influenza, Seasonal, Inj PF 10/02/2013     Influenza, inj, historic 09/10/2012     Tdap 07/26/2011       Patient Active Problem List   Diagnosis     Osteoarthritis Of The Knee     Hypothyroidism     Type 2 Diabetes Mellitus     Vitamin B12 Deficiency     Vitamin D Deficiency     Benign Essential Hypertension     Arteriosclerotic Cardiovascular Disease (ASCVD)     Peripheral Neuropathy     Joint Pain, Localized  In The Knee     Tremor     Essential tremor     S/P total knee replacement using cement       Past Medical History:   Diagnosis Date     Coronary artery disease      Diabetes mellitus     Type 2     Disease of thyroid gland     hypo     Essential tremor      Hypertension      Peripheral neuropathy        Social History     Social History     Marital status: Single     Spouse name: N/A     Number of children: N/A     Years of education: N/A     Occupational History     Not on file.     Social History Main Topics     Smoking status: Former Smoker     Quit date: 2012     Smokeless tobacco: Never Used     Alcohol use Yes      Comment: Rarely     Drug use: No     Sexual activity: Not on file     Other Topics Concern     Not on file     Social History Narrative       Past Surgical History:   Procedure Laterality Date     CYSTOSCOPY PROSTATE W/ LASER N/A 7/27/2016    Procedure: CYSTOSCOPY WITH TRANSURETHRAL RESECTION OF THE PROSTATE WITH QUANTA LASER;  Surgeon: Vince Calderon MD;  Location: [facility];  Service:      JOINT REPLACEMENT Right     knee     MO REVISE KNEE JOINT REPLACE,ALL PARTS Right 6/26/2015    Procedure: KNEE TOTAL ARTHROPLASTY REVISION RIGHT;  Surgeon: Ifeanyi Stevens MD;  Location: [facility];  Service: Orthopedics     ROTATOR CUFF REPAIR         History of Present Illness  Recent Health  Fever: no  Chills: no  Fatigue: no  Chest Pain: no  Cough: no  Dyspnea: no  Urinary Frequency: no  Nausea: no  Vomiting: no  Diarrhea: no  Abdominal Pain: no  Easy Bruising: no  Lower Extremity Swelling: no    Pertinent History  Prior Anesthesia: yes  Previous Anesthesia Reaction:  no  Diabetes: yes  Cardiovascular Disease: yes  Pulmonary Disease: no  Renal Disease: no  GI Disease: no  Sleep Apnea: no  Thromboembolic Problems: no  Clotting Disorder: no  Bleeding Disorder: no    Social history of there are no concerns regarding care after surgery    After surgery, the patient plans to recover at home  with family.    Review of Systems  Review of Systems   Constitutional: Negative.  Negative for fatigue and fever.   HENT: Negative.  Negative for congestion.    Eyes: Negative.    Respiratory: Negative.  Negative for cough and shortness of breath.    Cardiovascular: Negative.  Negative for chest pain.   Gastrointestinal: Negative.  Negative for constipation and diarrhea.   Endocrine: Negative.  Negative for polydipsia and polyuria.   Genitourinary: Negative.    Musculoskeletal:        Complains of left knee pain.   Skin: Negative.    Allergic/Immunologic: Negative.    Hematological: Negative.    Psychiatric/Behavioral: Negative.              Objective:         Vitals:    05/08/17 1452   BP: 106/62   Pulse: 60   Weight: (!) 258 lb 1.3 oz (117.1 kg)   Height: 6' (1.829 m)       Physical Exam:  Physical Exam   Constitutional: He is oriented to person, place, and time. He appears well-developed and well-nourished. No distress.   HENT:   Right Ear: External ear normal.   Left Ear: External ear normal.   Mouth/Throat: Oropharynx is clear and moist.   Eyes: Conjunctivae are normal. Pupils are equal, round, and reactive to light.   Neck: Normal range of motion. Neck supple. No JVD present. No thyromegaly present.   Cardiovascular: Normal rate, regular rhythm and normal heart sounds.    No murmur heard.  Pulmonary/Chest: Effort normal and breath sounds normal. No respiratory distress.   Abdominal: Soft. Bowel sounds are normal. He exhibits no mass. There is no tenderness.   Musculoskeletal:   There is tenderness noted over both medial and lateral joint lines of the left knee.  Mild effusion is present.  Anterior drawer and Lachman's are negative.  Heel or lateral instability present.   Lymphadenopathy:     He has no cervical adenopathy.   Neurological: He is alert and oriented to person, place, and time. He has normal reflexes.   Skin: Skin is warm and dry. No rash noted.   Psychiatric: He has a normal mood and affect.

## 2021-06-10 NOTE — ANESTHESIA PROCEDURE NOTES
Peripheral Block    Patient location during procedure: pre-op  Start time: 5/17/2017 7:05 AM  End time: 5/17/2017 7:10 AM  post-op analgesia per surgeon order as noted in medical record  Staffing:  Performing  Anesthesiologist: DIPTI KHAN  Preanesthetic Checklist  Completed: patient identified, site marked, risks, benefits, and alternatives discussed, timeout performed, consent obtained, airway assessed, oxygen available, suction available, emergency drugs available and hand hygiene performed  Peripheral Block  Block type: saphenous, adductor canal block  Prep: ChloraPrep  Patient position: supine  Patient monitoring: cardiac monitor, continuous pulse oximetry, heart rate and blood pressure  Laterality: left  Injection technique: ultrasound guided    Ultrasound used to visualize needle placement in proximity to nerve being blocked: yes   Permanent ultrasound image captured for medical record    Needle  Needle type: short-bevel   Needle gauge: 20G  Needle length: 4 in  no peripheral nerve catheter placed  Assessment  Injection assessment: no difficulty with injection, negative aspiration for heme, no paresthesia on injection and incremental injection

## 2021-06-10 NOTE — ANESTHESIA PROCEDURE NOTES
Spinal Block    Patient location during procedure: OR  Start time: 5/17/2017 7:30 AM  End time: 5/17/2017 7:35 AM  Reason for block: primary anesthetic    Staffing:  Performing  Anesthesiologist: DIPTI KHAN    Preanesthetic Checklist  Completed: patient identified, risks, benefits, and alternatives discussed, timeout performed, consent obtained, airway assessed, oxygen available, suction available, emergency drugs available and hand hygiene performed  Spinal Block  Patient position: sitting  Prep: Betadine  Patient monitoring: heart rate, cardiac monitor, continuous pulse ox and blood pressure  Approach: midline  Location: L4-5  Injection technique: single-shot  Needle type: pencil-tip   Needle gauge: 24 G

## 2021-06-10 NOTE — ANESTHESIA CARE TRANSFER NOTE
Last vitals:   Vitals:    05/17/17 0918   BP: 114/58   Pulse: (!) 55   Resp: 14   Temp: 36.4  C (97.6  F)   SpO2: 97%     Patient's level of consciousness is drowsy  Spontaneous respirations: yes  Maintains airway independently: yes  Dentition unchanged: yes  Oropharynx: oropharynx clear of all foreign objects    QCDR Measures:  ASA# 20 - Surgical Safety Checklist: ASA20A - Safety Checks Done  PQRS# 430 - Adult PONV Prevention: 4558F - Pt received => 2 anti-emetic agents (different classes) preop & intraop  ASA# 8 - Peds PONV Prevention: NA - Not pediatric patient, not GA or 2 or more risk factors NOT present  PQRS# 424 - Tita-op Temp Management: 4559F - At least one body temp DOCUMENTED => 35.5C or 95.9F within required timeframe  PQRS# 426 - PACU Transfer Protocol: - Transfer of care checklist used  ASA# 14 - Acute Post-op Pain: ASA14B - Patient did NOT experience pain >= 7 out of 10    I completed my SBAR handoff to the receiving nurse per policy and procedure.

## 2021-06-10 NOTE — ANESTHESIA PROCEDURE NOTES
Peripheral Block    Patient location during procedure: pre-op  Start time: 5/16/2017 2:54 PM  End time: 5/16/2017 3:00 PM  post-op analgesia per surgeon order as noted in medical record  Staffing:  Performing  Anesthesiologist: ANDREA MORGAN  Preanesthetic Checklist  Completed: patient identified, site marked, risks, benefits, and alternatives discussed, timeout performed, consent obtained, airway assessed, oxygen available, suction available, emergency drugs available and hand hygiene performed  Peripheral Block  Block type: saphenous, adductor canal block  Prep: ChloraPrep  Patient position: supine  Patient monitoring: cardiac monitor, continuous pulse oximetry, heart rate and blood pressure  Laterality: left  Injection technique: ultrasound guided    Ultrasound used to visualize needle placement in proximity to nerve being blocked: yes   Permanent ultrasound image captured for medical record    Needle  Needle type: Stimuplex   Needle gauge: 21 G  Needle length: 4 in  no peripheral nerve catheter placed  Assessment  Injection assessment: no difficulty with injection, negative aspiration for heme, incremental injection and no paresthesia on injection

## 2021-06-10 NOTE — PROGRESS NOTES
Subjective:   Tony comes in today for follow-up from his total knee surgery.  He is doing well.  He is now 1 week out from surgery.  He denies any breathing problems or chest pains.  His blood pressure is been under good control.  He is going to therapy 4 times weekly.  He does have a difficult time getting on his compression stockings.  His appetite is good.  He has no nausea.  Pain is fairly well controlled.  He is diabetic.  He states his blood sugars are in good control.  They were elevated in the hospital and he needed insulin for sugar control.      Objective:  HEENT: All within normal limits.  Neck: No increased JVD noted.  Lungs: Lungs are clear.  Patient's in no respiratory distress.  Cardiac: There is a regular rhythm present.  Rate was 60/min.  No murmur heard.  Extremities: 1-2+ edema noted in the left foot and pretibial area.  Knee is covered with Ace wrap and a dressing.  No obvious exudative process noted through the dressing.      Assessment:  1.  Degenerative joint disease knees.  Status post left total knee arthroplasty  2.  Hypertension in good control  3.  Coronary vessel disease stable the patient will continue all present medications.  4.  Diabetes mellitus      Plan:  He will continue therapy.  We talked about the importance of range of motion and increasing muscle strength in the leg.  He will elevate the leg as much as possible to help with edema.  Continue compression stockings.  Follow-up here only as needed.

## 2021-06-10 NOTE — ANESTHESIA PROCEDURE NOTES
Peripheral Block    Patient location during procedure: pre-op  Start time: 5/16/2017 2:37 PM  End time: 5/16/2017 2:53 PM  post-op analgesia per surgeon order as noted in medical record  Staffing:  Performing  Anesthesiologist: ANDREA MORGAN  Preanesthetic Checklist  Completed: patient identified, site marked, risks, benefits, and alternatives discussed, timeout performed, consent obtained, airway assessed, oxygen available, suction available, emergency drugs available and hand hygiene performed  Peripheral Block  Block type: sciatic (tibial), popliteal  Prep: ChloraPrep  Patient position: supine  Patient monitoring: cardiac monitor, continuous pulse oximetry, heart rate and blood pressure  Laterality: left  Injection technique: ultrasound guided and nerve stimulator  Nerve Stimulator mAmp: 0.6  Ultrasound used to visualize needle placement in proximity to nerve being blocked: yes   Permanent ultrasound image captured for medical record    Needle  Needle type: Stimuplex   Needle gauge: 21 G  Needle length: 4 in  no peripheral nerve catheter placed  Assessment  Injection assessment: no difficulty with injection, negative aspiration for heme, no paresthesia on injection and incremental injection

## 2021-06-10 NOTE — ANESTHESIA POSTPROCEDURE EVALUATION
Patient: Tony Hoffman  LEFT TOTAL KNEE ARTHROPLASTY  Anesthesia type: spinal    Patient location: PACU  Last vitals:   Vitals:    05/17/17 1220   BP: 154/73   Pulse: (!) 55   Resp: 16   Temp: 36.6  C (97.9  F)   SpO2: 94%     Post vital signs: stable  Level of consciousness: awake and responds to simple questions  Post-anesthesia pain: pain controlled  Post-anesthesia nausea and vomiting: no  Pulmonary: unassisted, return to baseline  Cardiovascular: stable and blood pressure at baseline  Hydration: adequate  Anesthetic events: no    QCDR Measures:  ASA# 11 - Tita-op Cardiac Arrest: ASA11B - Patient did NOT experience unanticipated cardiac arrest  ASA# 12 - Tita-op Mortality Rate: ASA12B - Patient did NOT die  ASA# 13 - PACU Re-Intubation Rate: ASA13B - Patient did NOT require a new airway mgmt  ASA# 10 - Composite Anes Safety: ASA10A - No serious adverse event  ASA# 38 - New Corneal Injury: ASA38A - No new exposure keratitis or corneal abrasion in PACU    Additional Notes:    Garry Vargas

## 2021-06-10 NOTE — ANESTHESIA PROCEDURE NOTES
Peripheral Block    Patient location during procedure: pre-op  Start time: 5/17/2017 7:10 AM  End time: 5/17/2017 7:15 AM  post-op analgesia per surgeon order as noted in medical record  Staffing:  Performing  Anesthesiologist: DIPTI KHAN  Preanesthetic Checklist  Completed: patient identified, site marked, risks, benefits, and alternatives discussed, timeout performed, consent obtained, airway assessed, oxygen available, suction available, emergency drugs available and hand hygiene performed  Peripheral Block  Block type: sciatic, popliteal  Prep: ChloraPrep  Patient position: supine  Patient monitoring: cardiac monitor, continuous pulse oximetry, heart rate and blood pressure  Laterality: left  Injection technique: ultrasound guided    Ultrasound used to visualize needle placement in proximity to nerve being blocked: yes   Permanent ultrasound image captured for medical record    Needle  Needle type: short-bevel   Needle gauge: 20G  Needle length: 4 in  no peripheral nerve catheter placed  Assessment  Injection assessment: no difficulty with injection, negative aspiration for heme, no paresthesia on injection and incremental injection

## 2021-06-12 NOTE — PROGRESS NOTES
Subjective findings: The patient returns to the clinic today for annual diabetic foot check.  Patient complained of pain involving the toes of both feet.  He stated that due to the position of his toes he weightbears on the tip of the toes of both feet.  This is very uncomfortable with weightbearing and ambulating.     Objective findings: Nails bilateral feet are elongated and normal length and color.  Skin bilateral feet warm supple and intact.  DP and PT pulses +2 over 4 bilateral feet.  Capillary refill less than 2 seconds bilateral feet.  Negative clonus, negative Babinski bilateral feet.  Range of motion within normal limits bilateral feet.  Muscle power +5 over 5 bilaterally with him all compartments.  There is slight loss of protective sensation plantar aspect both feet.  There are flexion deformities at the proximal interphalangeal joint digits 2 through 5 both feet.  There is an exaggerated height of the medial longitudinal arch bilateral.     Assessment: Onychauxis, cavus foot deformity, hammertoes, diabetes mellitus     Plan: Trimmed nails 1 through 5 both feet I have recommended extra depth diabetic shoes with molded insoles

## 2021-06-13 NOTE — PROGRESS NOTES
Ordering 1 pair TAYLA Faust 11 1/2 W and 3 pair custom inserts from [facility] previous scan # [id number]

## 2021-06-16 NOTE — TELEPHONE ENCOUNTER
Telephone Encounter by Cristal Thompson at 6/25/2019 12:06 PM     Author: Cristal Thompson Service: -- Author Type: --    Filed: 6/25/2019 12:14 PM Encounter Date: 6/25/2019 Status: Signed    : Cristal Thompson James P, MD  [facility] Nurse Triage Pool             Please triage for visual changes, thanks.    Previous Messages            Attached Notes     Telephone Encounter by Ya Stephen LPN at 6/18/2019  1:42 PM     Author: Ya Stephen LPN Service: -- Author Type: Licensed Nurse   Filed: 6/18/2019  1:52 PM Encounter Date: 6/18/2019 Note Type: Telephone Encounter   Status: Cosign Needed : Ya Stephen LPN (Licensed Nurse)   Cosign Required: Yes      Calling patient as he is overdue for diabetic eye exam.     He said vision changed/has gotten worse since stroke in January.  He is planning to schedule eye exam in a few weeks.     He  asked if vision changes are a cause for concern or are a common occurrence after a stroke.     I said I would relay message to PCP.  I added that annual eye exams are recommended for diabetics.

## 2021-06-17 NOTE — PROGRESS NOTES
Subjective:   Tony comes in today for check of his blood pressure.  He seems to be doing fairly well with that.  He denies any blood pressure symptoms such as headache or chest pains.  He denies palpitations.  He also needs his diabetes checked.  He only checks is a proximally once a year.  He denies polyuria or polydipsia.  He feels like his sugars are averaging approximately 150.  He stays fairly active.  He goes to [place] over the winter so was quite active throughout the winter.  He has been having some burning sensation and a tight feeling over the feet and toes.  Both feet are involved.  He wonders whether this may be from his diabetes.  He has had diabetes for many years now.      Objective:  HEENT: Pupils equally round and reactive to light.  Fundi are benign.  Pharynx clear.  Neck: No increased JVD noted.  No bruits heard.  Lungs: Patient is in no respiratory distress.  Lungs are clear bilaterally.  Cardiac: There is a regular rhythm present.  I heard no murmur.  Abdomen: Abdomen appears soft and nontender.  Musculoskeletal: Pulses are strong in both feet.  Trace edema noted in the feet.  Calves are nontender.  Sensory exam the toes shows decreased sensation to light touch.  Skin integrity normal.      Assessment:  1.  Diabetes mellitus in good control  2.  Hypertension in good control  3.  Peripheral neuropathy      Plan:  B12 and thyroid will be checked to rule out other causes of peripheral neuropathy besides diabetes.  Patient also will have routine lab work done to update his diabetic labs and hypertensive labs.  He will continue all present medications.  Continue an exercise program and good well-balanced diet.  Follow-up here in 3-6 months.

## 2021-06-19 NOTE — PROGRESS NOTES
HISTORY OF PRESENT ILLNESS  Patient comes in today for removal of cerumen impaction. No pain or drainage. He feels that his hearing may be decreased.     REVIEW OF SYSTEMS  Review of Systems: a 10-system review was performed. Pertinent positives are noted in the HPI and on a separate scanned document in the chart.    PMH, PSH, FH and SH has documented in the EHR.      EXAM    CONSTITUTIONAL  General Appearance:  Normal, well developed, well nourished, no obvious distress  Ability to Communicate:  communicates appropriately.    HEAD AND FACE  Appearance and Symmetry:  Normal, no scalp or facial scarring or suspicious lesions.    EARS  Clinical speech reception threshold:  Normal, able to hear normal speech.  Auricle:  Normal, Auricles without scars, lesions, masses.  External auditory canal:  Bilateral cerumen impaction debrided under otomicroscopy using suction, curettage and forceps.  Tympanic membrane:  Normal, Tympanic membranes normal without swelling or erythema.  Tympanic membrane mobility:  Normal, Normal tympanic membrane mobility.    NEUROLOGICAL  Speech pattern:  Normal, Proasaic    RESPIRATORY  Symmetry and Respiratory effort  Normal, Symmetric chest movement and expansion with no increased intercostal retractions or use of accessory muscles.     IMPRESSION  Bilateral cerumen impaction.    RECOMMENDATION  Follow up as needed.    Orville Menchaca MD

## 2021-06-19 NOTE — LETTER
Letter by Van Melgar MD at      Author: Van Melgar MD Service: -- Author Type: --    Filed:  Encounter Date: 5/22/2019 Status: (Other)         Cj Harris MD  [address]  [facility] [address]                                  May 22, 2019    Patient: Tony Hoffman   MR Number: [id number]   YOB: 1945   Date of Visit: 5/22/2019     Dear Dr. Steven MD:    Thank you for referring Tony Hoffman to me for evaluation. Below are the relevant portions of my assessment and plan of care.    If you have questions, please do not hesitate to call me. I look forward to following Tony along with you.    Sincerely,        Van Melgar MD          CC  No Recipients  Van Melgar MD  5/22/2019 11:40 AM  Addendum  Dear Cj Harris MD    Thank you for the opportunity to participate in the care of  Tony Hoffman.    Tony Hoffman is sent by Cj Harris MD for a sleep consultation regarding snoring and possible sleep apnea in the context of CVA.     He has a history of CAD, CVAs, HTN, ET, hypothyroidism, OA, and DM2.  Just had stroke in January.    Schedule - Retired.  Typically in [place] in the colder months.     Usually in bed around midnight and asleep within 10 minutes.  Up around 5 times per night to urinate and has trouble falling back asleep.   Up for the day around 6:30 - 7 AM.  Has unintentional naps during the day when watching TV.     Sleep Disordered Breathing - Frequent and loud snoring, witnessed apneas, and snort arousals.     Frequent nocturia.   Upon waking feels tired.  He denies morning headaches.  Regular morning dry mouth and morning sinus congestion.   Patient was counseled on the importance of driving while alert, to pull over if drowsy, or nap before getting into the vehicle if sleepy.    Movement/Behaviors - No somniloquy.  No somnambulism.  No sleep related eating.  No dream enactment behavior.   He denies bruxism.    Patient denies  typical restless legs syndrome symptoms.    Alcohol use - 2 drinks per day in [place] (5 months) and 1 every 2 months in [place].  Caffeine intake - coffee 2 /day. Last caffeine intake is usually in the morning.  Tobacco exposure - none  Illicit substances - none    Lives with no one.  Has no pets.     No knon family history of snoring or Obstructive Sleep Apnea.    Past Medical History:  Past Medical History:   Diagnosis Date   ? Coronary artery disease    ? Diabetes mellitus (H)     Type 2   ? Disease of thyroid gland     hypo   ? Essential tremor    ? Hypertension    ? Peripheral neuropathy      Problem List:  Patient Active Problem List    Diagnosis Date Noted   ? Urinary frequency 07/18/2018   ? Status post total left knee replacement 05/16/2017   ? Anesthesia 05/16/2017   ? Hyperlipidemia, unspecified hyperlipidemia type    ? Neuropathy (H)    ? S/P total knee replacement using cement 06/26/2015   ? Essential tremor    ? Tremor      Overview Note:     Created by Conversion       ? Peripheral Neuropathy      Overview Note:     Created by Conversion    Replacement Utility updated for latest IMO load     ? Osteoarthritis Of The Knee      Overview Note:     Created by Conversion    Replacement Utility updated for latest IMO load     ? Hypothyroidism, unspecified type    ? Type 2 diabetes mellitus with complication, without long-term current use of insulin (H)      Overview Note:     Created by Conversion       ? Vitamin B12 Deficiency      Overview Note:     Created by Conversion       ? Vitamin D deficiency    ? Essential hypertension    ? Arteriosclerotic Cardiovascular Disease (ASCVD)         Past Surgical History:  Past Surgical History:   Procedure Laterality Date   ? CARDIAC CATHETERIZATION     ? CYSTOSCOPY PROSTATE W/ LASER N/A 7/27/2016    Procedure: CYSTOSCOPY WITH TRANSURETHRAL RESECTION OF THE PROSTATE WITH QUANTA LASER;  Surgeon: Vince Calderon MD;  Location: [facility];  Service:    ?  JOINT REPLACEMENT Right     knee   ? CT REVISE KNEE JOINT REPLACE,ALL PARTS Right 6/26/2015    Procedure: KNEE TOTAL ARTHROPLASTY REVISION RIGHT;  Surgeon: Ifeanyi Stevens MD;  Location: [facility];  Service: Orthopedics   ? CT TOTAL KNEE ARTHROPLASTY Left 5/17/2017    Procedure: LEFT TOTAL KNEE ARTHROPLASTY;  Surgeon: Ifeanyi Stevens MD;  Location: [facility];  Service: Orthopedics   ? ROTATOR CUFF REPAIR          Meds:  Current Outpatient Medications   Medication Sig Dispense Refill   ? amLODIPine (NORVASC) 10 MG tablet Take 1 tablet (10 mg total) by mouth daily. 90 tablet 3   ? aspirin 81 MG EC tablet Take 81 mg by mouth daily.     ? atorvastatin (LIPITOR) 20 MG tablet Take 1 tablet (20 mg total) by mouth daily. 90 tablet 3   ? coenzyme Q10 200 mg capsule Take 200 mg by mouth daily.     ? cyanocobalamin 1000 MCG tablet Take 1,000 mcg by mouth daily.     ? docoshexanoic acid-eicosapent 500 mg (FISH OIL) 500-100 mg cap capsule Take 500 mg by mouth daily.      ? gabapentin (NEURONTIN) 300 MG capsule Take 1 capsule (300 mg total) by mouth 3 (three) times a day. 270 capsule 3   ? glucosamine sulfate 500 mg cap Take 500 mg by mouth daily.      ? levothyroxine (SYNTHROID, LEVOTHROID) 100 MCG tablet Take 1 tablet (100 mcg total) by mouth Daily at 6:00 am. 90 tablet 3   ? metFORMIN (GLUCOPHAGE) 1000 MG tablet TAKE ONE TABLET BY MOUTH TWICE A DAY WITH MEALS  180 tablet 1   ? metoprolol succinate (TOPROL-XL) 100 MG 24 hr tablet Take 1 tablet (100 mg total) by mouth daily. 90 tablet 3   ? multivitamin with minerals (THERA-M) 9 mg iron-400 mcg Tab tablet Take 1 tablet by mouth daily.     ? nitroglycerin (NITROSTAT) 0.4 MG SL tablet Place 0.4 mg under the tongue every 5 (five) minutes as needed for chest pain.     ? ONETOUCH DELICA LANCETS 33 gauge Misc USE TO TEST ONCE DAILY 100 each 3   ? ONETOUCH ULTRA BLUE TEST STRIP strips USE TO TEST ONCE DAILY 50 strip 7   ? ONETOUCH ULTRA2 Use 1 each As Directed daily. Type 2  diabetes mellitus with complication, without long-term current use of insulin  E11.8 1 each 0   ? primidone (MYSOLINE) 50 MG tablet Take 250 mg by mouth at bedtime.            ? tamsulosin (FLOMAX) 0.4 mg cap Take 1 capsule (0.4 mg total) by mouth daily after supper. 90 capsule 3   ? triamterene-hydrochlorothiazide (DYAZIDE) 37.5-25 mg per capsule Take 1 capsule by mouth every morning. 90 capsule 3     No current facility-administered medications for this visit.         Allergies:  Lisinopril and Wellbutrin [bupropion hcl]     Social History:  Social History     Socioeconomic History   ? Marital status: Single     Spouse name: Not on file   ? Number of children: Not on file   ? Years of education: Not on file   ? Highest education level: Not on file   Occupational History   ? Not on file   Social Needs   ? Financial resource strain: Not on file   ? Food insecurity:     Worry: Not on file     Inability: Not on file   ? Transportation needs:     Medical: Not on file     Non-medical: Not on file   Tobacco Use   ? Smoking status: Former Smoker     Last attempt to quit: 2012     Years since quittin.3   ? Smokeless tobacco: Never Used   Substance and Sexual Activity   ? Alcohol use: Yes     Comment: Rarely   ? Drug use: No   ? Sexual activity: Not on file   Lifestyle   ? Physical activity:     Days per week: Not on file     Minutes per session: Not on file   ? Stress: Not on file   Relationships   ? Social connections:     Talks on phone: Not on file     Gets together: Not on file     Attends Restoration service: Not on file     Active member of club or organization: Not on file     Attends meetings of clubs or organizations: Not on file     Relationship status: Not on file   ? Intimate partner violence:     Fear of current or ex partner: Not on file     Emotionally abused: Not on file     Physically abused: Not on file     Forced sexual activity: Not on file   Other Topics Concern   ? Not on file   Social History  Narrative   ? Not on file        Family History:  Family History   Problem Relation Age of Onset   ? Anesthesia problems Neg Hx         Review of Systems: Changes in vision; constipation; excessive urination; heat or cold intolerance  A complete review of systems reviewed by me is negative with the exeption of what has been mentioned in the history of present illness.    Physical Exam:  /56 (Patient Site: Right Arm, Patient Position: Sitting, Cuff Size: Adult Regular)   Pulse (!) 57   Ht 6' (1.829 m)   Wt (!) 250 lb 6.4 oz (113.6 kg)   SpO2 96%   BMI 33.96 kg/m     General appearance: No apparent distress, well groomed.    HEENT:   Head: Normocephalic, atraumatic.  Eyes: PERRL  Nose: Nares patent.  No exudate.  No septal deviation noted.  Mouth: Teeth: Upper partial plate and missing lower teeth.   Tongue: Normal  Oropharynx:  Mallampati Classification: II    Tonsils: Grade 0    Uvula: Normal    Neck: Supple without bruit.      Cardiac: Regular rate and rhythm.  No murmurs.  Radial pulses are strong and symmetric.  Pulmonary: Symmetric air movement, lungs clear to auscultation bilaterally.  Musculoskeletal: No edema noted.  No clubbing or cyanosis.  Skin: Warm, dry, intact.  Neurologic: Alert and oriented to person, place and time   Gait is slow and get-up-and-go testing was delayed.  Psychiatric: Affect pleasant.  Mood good.     Labs/Studies:  Lab Results   Component Value Date    WBC 6.2 10/17/2018    HGB 14.3 10/17/2018    HCT 43.5 10/17/2018    MCV 86 10/17/2018     10/17/2018         Chemistry        Component Value Date/Time     10/17/2018 1018    K 3.8 10/17/2018 1018     10/17/2018 1018    CO2 22 10/17/2018 1018    BUN 21 10/17/2018 1018    CREATININE 0.99 10/17/2018 1018     (H) 10/17/2018 1018        Component Value Date/Time    CALCIUM 10.4 10/17/2018 1018    ALKPHOS 50 10/17/2018 1018    AST 19 10/17/2018 1018    ALT 19 10/17/2018 1018    BILITOT 0.4 10/17/2018 1018         No results found for: FERRITIN  Lab Results   Component Value Date    TSH 3.55 10/17/2018     Lab Results   Component Value Date    HGBA1C 7.0 (H) 10/17/2018     2D ECHO reviewed: Date: 4/29/2019  eLVEF 55% with hypertrophy; biatrial enlargement.  Mild RV SF reduction.      Assessment and Plan:  1. Snoring  I have a high suspicion for CHEYANNE based on presence of snoring, witnessed apneas, obesity, elevated neck circumference, gender and ESS. We discussed pathophysiology of obstructive sleep apnea, testing with overnight polysomnography, and treatment modalities (CPAP only discussed at this visit). We discussed consequences of untreated Obstructive Sleep Apnea, such as markedly elevated risk for heart attack or stroke. Patient is interested in treatment and willing to undergo overnight sleep testing.  Study has been ordered today.    - Split Night Sleep Study; Future    2. Witnessed apneic spells  Concern for CHEYANNE per above but symptoms have been present for decades.  - Split Night Sleep Study; Future    3. Excessive sleepiness  Discussed sleep hygiene and CHEYANNE as well.  - Split Night Sleep Study; Future    4. Cerebrovascular accident (CVA), unspecified mechanism (H)  Concern for CHEYANNE as a risk factor for prior and future CVAs. Discussed testing and risk reduction.  - Split Night Sleep Study; Future    5. Class 1 obesity due to excess calories with serious comorbidity and body mass index (BMI) of 33.0 to 33.9 in adult  We discussed the link between obesity, sleep apnea, and health outcomes.  Patient was encouraged to decrease caloric intake and increase activity levels to try to move towards a normal weight.  He was encouraged to discuss further strategies with his primary care provider.     Patient verbalized understanding of these issues, agrees with the plan and all questions were answered today. Patient was given an opportuntity to voice any other symptoms or concerns not listed above. Patient did not have any  other symptoms or concerns.     MD SAVANNA Lopez Board Certified in Internal Medicine and Sleep Medicine  [facility].

## 2021-06-19 NOTE — LETTER
Letter by Van Melgar MD at      Author: Van Melgar MD Service: -- Author Type: --    Filed:  Encounter Date: 5/22/2019 Status: (Other)         Roland Louis MD  [address]  [place] [address]                                  May 22, 2019    Patient: Tony Hoffman   MR Number: [id number]   YOB: 1945   Date of Visit: 5/22/2019     Dear Dr. Missy MD:    Thank you for referring Tony Hoffman to me for evaluation. Below are the relevant portions of my assessment and plan of care.    If you have questions, please do not hesitate to call me. I look forward to following Tony along with you.    Sincerely,        Van Melgar MD          CC  No Recipients  Van Melgar MD  5/22/2019 11:40 AM  Addendum  Dear Cj Harris MD    Thank you for the opportunity to participate in the care of  Tony Hoffman.    Tony Hoffman is sent by Cj Harris MD for a sleep consultation regarding snoring and possible sleep apnea in the context of CVA.     He has a history of CAD, CVAs, HTN, ET, hypothyroidism, OA, and DM2.  Just had stroke in January.    Schedule - Retired.  Typically in [place] in the colder months.     Usually in bed around midnight and asleep within 10 minutes.  Up around 5 times per night to urinate and has trouble falling back asleep.   Up for the day around 6:30 - 7 AM.  Has unintentional naps during the day when watching TV.     Sleep Disordered Breathing - Frequent and loud snoring, witnessed apneas, and snort arousals.     Frequent nocturia.   Upon waking feels tired.  He denies morning headaches.  Regular morning dry mouth and morning sinus congestion.   Patient was counseled on the importance of driving while alert, to pull over if drowsy, or nap before getting into the vehicle if sleepy.    Movement/Behaviors - No somniloquy.  No somnambulism.  No sleep related eating.  No dream enactment behavior.   He denies bruxism.    Patient denies  typical restless legs syndrome symptoms.    Alcohol use - 2 drinks per day in [place] (5 months) and 1 every 2 months in [place].  Caffeine intake - coffee 2 /day. Last caffeine intake is usually in the morning.  Tobacco exposure - none  Illicit substances - none    Lives with no one.  Has no pets.     No knon family history of snoring or Obstructive Sleep Apnea.    Past Medical History:  Past Medical History:   Diagnosis Date   ? Coronary artery disease    ? Diabetes mellitus (H)     Type 2   ? Disease of thyroid gland     hypo   ? Essential tremor    ? Hypertension    ? Peripheral neuropathy      Problem List:  Patient Active Problem List    Diagnosis Date Noted   ? Urinary frequency 07/18/2018   ? Status post total left knee replacement 05/16/2017   ? Anesthesia 05/16/2017   ? Hyperlipidemia, unspecified hyperlipidemia type    ? Neuropathy (H)    ? S/P total knee replacement using cement 06/26/2015   ? Essential tremor    ? Tremor      Overview Note:     Created by Conversion       ? Peripheral Neuropathy      Overview Note:     Created by Conversion    Replacement Utility updated for latest IMO load     ? Osteoarthritis Of The Knee      Overview Note:     Created by Conversion    Replacement Utility updated for latest IMO load     ? Hypothyroidism, unspecified type    ? Type 2 diabetes mellitus with complication, without long-term current use of insulin (H)      Overview Note:     Created by Conversion       ? Vitamin B12 Deficiency      Overview Note:     Created by Conversion       ? Vitamin D deficiency    ? Essential hypertension    ? Arteriosclerotic Cardiovascular Disease (ASCVD)         Past Surgical History:  Past Surgical History:   Procedure Laterality Date   ? CARDIAC CATHETERIZATION     ? CYSTOSCOPY PROSTATE W/ LASER N/A 7/27/2016    Procedure: CYSTOSCOPY WITH TRANSURETHRAL RESECTION OF THE PROSTATE WITH QUANTA LASER;  Surgeon: Vince Calderon MD;  Location: [facility] [place];  Service:    ?  JOINT REPLACEMENT Right     knee   ? NE REVISE KNEE JOINT REPLACE,ALL PARTS Right 6/26/2015    Procedure: KNEE TOTAL ARTHROPLASTY REVISION RIGHT;  Surgeon: Ifeanyi Stevens MD;  Location: [facility] OR;  Service: Orthopedics   ? NE TOTAL KNEE ARTHROPLASTY Left 5/17/2017    Procedure: LEFT TOTAL KNEE ARTHROPLASTY;  Surgeon: Ifeanyi Stevens MD;  Location: [facility];  Service: Orthopedics   ? ROTATOR CUFF REPAIR          Meds:  Current Outpatient Medications   Medication Sig Dispense Refill   ? amLODIPine (NORVASC) 10 MG tablet Take 1 tablet (10 mg total) by mouth daily. 90 tablet 3   ? aspirin 81 MG EC tablet Take 81 mg by mouth daily.     ? atorvastatin (LIPITOR) 20 MG tablet Take 1 tablet (20 mg total) by mouth daily. 90 tablet 3   ? coenzyme Q10 200 mg capsule Take 200 mg by mouth daily.     ? cyanocobalamin 1000 MCG tablet Take 1,000 mcg by mouth daily.     ? docoshexanoic acid-eicosapent 500 mg (FISH OIL) 500-100 mg cap capsule Take 500 mg by mouth daily.      ? gabapentin (NEURONTIN) 300 MG capsule Take 1 capsule (300 mg total) by mouth 3 (three) times a day. 270 capsule 3   ? glucosamine sulfate 500 mg cap Take 500 mg by mouth daily.      ? levothyroxine (SYNTHROID, LEVOTHROID) 100 MCG tablet Take 1 tablet (100 mcg total) by mouth Daily at 6:00 am. 90 tablet 3   ? metFORMIN (GLUCOPHAGE) 1000 MG tablet TAKE ONE TABLET BY MOUTH TWICE A DAY WITH MEALS  180 tablet 1   ? metoprolol succinate (TOPROL-XL) 100 MG 24 hr tablet Take 1 tablet (100 mg total) by mouth daily. 90 tablet 3   ? multivitamin with minerals (THERA-M) 9 mg iron-400 mcg Tab tablet Take 1 tablet by mouth daily.     ? nitroglycerin (NITROSTAT) 0.4 MG SL tablet Place 0.4 mg under the tongue every 5 (five) minutes as needed for chest pain.     ? ONETOUCH DELICA LANCETS 33 gauge Misc USE TO TEST ONCE DAILY 100 each 3   ? ONETOUCH ULTRA BLUE TEST STRIP strips USE TO TEST ONCE DAILY 50 strip 7   ? ONETOUCH ULTRA2 Use 1 each As Directed daily. Type 2  diabetes mellitus with complication, without long-term current use of insulin  E11.8 1 each 0   ? primidone (MYSOLINE) 50 MG tablet Take 250 mg by mouth at bedtime.            ? tamsulosin (FLOMAX) 0.4 mg cap Take 1 capsule (0.4 mg total) by mouth daily after supper. 90 capsule 3   ? triamterene-hydrochlorothiazide (DYAZIDE) 37.5-25 mg per capsule Take 1 capsule by mouth every morning. 90 capsule 3     No current facility-administered medications for this visit.         Allergies:  Lisinopril and Wellbutrin [bupropion hcl]     Social History:  Social History     Socioeconomic History   ? Marital status: Single     Spouse name: Not on file   ? Number of children: Not on file   ? Years of education: Not on file   ? Highest education level: Not on file   Occupational History   ? Not on file   Social Needs   ? Financial resource strain: Not on file   ? Food insecurity:     Worry: Not on file     Inability: Not on file   ? Transportation needs:     Medical: Not on file     Non-medical: Not on file   Tobacco Use   ? Smoking status: Former Smoker     Last attempt to quit: 2012     Years since quittin.3   ? Smokeless tobacco: Never Used   Substance and Sexual Activity   ? Alcohol use: Yes     Comment: Rarely   ? Drug use: No   ? Sexual activity: Not on file   Lifestyle   ? Physical activity:     Days per week: Not on file     Minutes per session: Not on file   ? Stress: Not on file   Relationships   ? Social connections:     Talks on phone: Not on file     Gets together: Not on file     Attends Hinduism service: Not on file     Active member of club or organization: Not on file     Attends meetings of clubs or organizations: Not on file     Relationship status: Not on file   ? Intimate partner violence:     Fear of current or ex partner: Not on file     Emotionally abused: Not on file     Physically abused: Not on file     Forced sexual activity: Not on file   Other Topics Concern   ? Not on file   Social History  Narrative   ? Not on file        Family History:  Family History   Problem Relation Age of Onset   ? Anesthesia problems Neg Hx         Review of Systems: Changes in vision; constipation; excessive urination; heat or cold intolerance  A complete review of systems reviewed by me is negative with the exeption of what has been mentioned in the history of present illness.    Physical Exam:  /56 (Patient Site: Right Arm, Patient Position: Sitting, Cuff Size: Adult Regular)   Pulse (!) 57   Ht 6' (1.829 m)   Wt (!) 250 lb 6.4 oz (113.6 kg)   SpO2 96%   BMI 33.96 kg/m     General appearance: No apparent distress, well groomed.    HEENT:   Head: Normocephalic, atraumatic.  Eyes: PERRL  Nose: Nares patent.  No exudate.  No septal deviation noted.  Mouth: Teeth: Upper partial plate and missing lower teeth.   Tongue: Normal  Oropharynx:  Mallampati Classification: II    Tonsils: Grade 0    Uvula: Normal    Neck: Supple without bruit.      Cardiac: Regular rate and rhythm.  No murmurs.  Radial pulses are strong and symmetric.  Pulmonary: Symmetric air movement, lungs clear to auscultation bilaterally.  Musculoskeletal: No edema noted.  No clubbing or cyanosis.  Skin: Warm, dry, intact.  Neurologic: Alert and oriented to person, place and time   Gait is slow and get-up-and-go testing was delayed.  Psychiatric: Affect pleasant.  Mood good.     Labs/Studies:  Lab Results   Component Value Date    WBC 6.2 10/17/2018    HGB 14.3 10/17/2018    HCT 43.5 10/17/2018    MCV 86 10/17/2018     10/17/2018         Chemistry        Component Value Date/Time     10/17/2018 1018    K 3.8 10/17/2018 1018     10/17/2018 1018    CO2 22 10/17/2018 1018    BUN 21 10/17/2018 1018    CREATININE 0.99 10/17/2018 1018     (H) 10/17/2018 1018        Component Value Date/Time    CALCIUM 10.4 10/17/2018 1018    ALKPHOS 50 10/17/2018 1018    AST 19 10/17/2018 1018    ALT 19 10/17/2018 1018    BILITOT 0.4 10/17/2018 1018         No results found for: FERRITIN  Lab Results   Component Value Date    TSH 3.55 10/17/2018     Lab Results   Component Value Date    HGBA1C 7.0 (H) 10/17/2018     2D ECHO reviewed: Date: 4/29/2019  eLVEF 55% with hypertrophy; biatrial enlargement.  Mild RV SF reduction.      Assessment and Plan:  1. Snoring  I have a high suspicion for CHEYANNE based on presence of snoring, witnessed apneas, obesity, elevated neck circumference, gender and ESS. We discussed pathophysiology of obstructive sleep apnea, testing with overnight polysomnography, and treatment modalities (CPAP only discussed at this visit). We discussed consequences of untreated Obstructive Sleep Apnea, such as markedly elevated risk for heart attack or stroke. Patient is interested in treatment and willing to undergo overnight sleep testing.  Study has been ordered today.    - Split Night Sleep Study; Future    2. Witnessed apneic spells  Concern for CHEYANNE per above but symptoms have been present for decades.  - Split Night Sleep Study; Future    3. Excessive sleepiness  Discussed sleep hygiene and CHEYANNE as well.  - Split Night Sleep Study; Future    4. Cerebrovascular accident (CVA), unspecified mechanism (H)  Concern for CHEYANNE as a risk factor for prior and future CVAs. Discussed testing and risk reduction.  - Split Night Sleep Study; Future    5. Class 1 obesity due to excess calories with serious comorbidity and body mass index (BMI) of 33.0 to 33.9 in adult  We discussed the link between obesity, sleep apnea, and health outcomes.  Patient was encouraged to decrease caloric intake and increase activity levels to try to move towards a normal weight.  He was encouraged to discuss further strategies with his primary care provider.     Patient verbalized understanding of these issues, agrees with the plan and all questions were answered today. Patient was given an opportuntity to voice any other symptoms or concerns not listed above. Patient did not have any  other symptoms or concerns.     MD SAVANNA Lopez Board Certified in Internal Medicine and Sleep Medicine  [facility].

## 2021-06-20 NOTE — PROGRESS NOTES
Subjective findings: The patient returns to the clinic today for annual diabetic foot check.  Patient complained of pain involving the toes of both feet.  He stated that due to the position of his toes he weightbears on the tip of the toes of both feet.  This is very uncomfortable with weightbearing and ambulating.  He has numbness on the bottom of both feet.  He also has some mild weakness of the toes both feet.      Objective findings: Nails bilateral feet are elongated and normal length and color.  Skin bilateral feet warm supple and intact.  DP and PT pulses +2 over 4 bilateral feet.  Capillary refill less than 2 seconds bilateral feet.  Negative clonus, negative Babinski bilateral feet.  Negative Tinel sign tarsal tunnel bilateral feet.  Range of motion within normal limits bilateral feet.  Muscle power +5 over 5 bilaterally with him all compartments.  There is slight loss of protective sensation plantar aspect both feet.  There are flexion deformities at the proximal interphalangeal joint digits 2 through 5 both feet.  There is an exaggerated height of the medial longitudinal arch bilateral.      Assessment: Onychauxis, cavus foot deformity, hammertoes, diabetes mellitus      Plan:  I have recommended extra depth diabetic shoes with molded insoles

## 2021-06-21 NOTE — PROGRESS NOTES
Assessment and Plan:   1.  Annual wellness visit  2.  Diabetes mellitus  3.  Gait instability.  Rule out neuromuscular disease.  Rule out early Parkinson disease.  4.  Essential tremor  5.  BPH with urinary frequency  6.  History of hypertension  7.  Hyperlipidemia      The patient's current medical problems were reviewed.      The following health maintenance schedule was reviewed with the patient and provided in printed form in the after visit summary:   Health Maintenance   Topic Date Due     DIABETES FOLLOW-UP  1945     DIABETES FOOT EXAM  05/09/1956     DIABETES OPHTHALMOLOGY EXAM  05/09/1956     ADVANCE DIRECTIVES DISCUSSED WITH PATIENT  05/09/1964     ZOSTER VACCINE  05/09/2006     PNEUMOCOCCAL POLYSACCHARIDE VACCINE AGE 65 AND OVER  05/09/2011     PNEUMOCOCCAL CONJUGATE VACCINE FOR ADULTS (PCV13 OR PREVNAR)  05/09/2011     DIABETES URINE MICROALBUMIN  10/02/2014     INFLUENZA VACCINE RULE BASED (1) 08/01/2018     DIABETES HEMOGLOBIN A1C  10/24/2018     FALL RISK ASSESSMENT  04/24/2019     TD 18+ HE  07/26/2021     COLONOSCOPY  09/14/2025        Subjective:   Chief Complaint: Tony Hoffman is an 73 y.o. male here for an Annual Wellness visit.   HPI: Tony comes in today for a physical.  He would like a full blood workup done.  He wants vitamin levels done.  He would like as much lab done as possible.  He is concerned because I am going to be retiring at the end of the year and he is unsure what physician he will be seen.  He also has concerns about his memory.  He gets forgetful.  His brother thinks he is having problems with this.  He would like that worked up.  He does see a neurologist but missed his last appointment and will be going to [place] so is unable to see him before he leaves next month.  He needs 5 months of his medications filled at the pharmacy.  He will be gone for 5 months in [place].  He also has been having some gait instability.  He feels like his balance is off.  If he exercises  routinely his balance seems better.  He denies severe headaches.  When he initially stands from a sitting position his balance seems the worst.  He states he gets chelation therapy in [place].  He feels like that has been keeping him healthy.    Review of Systems:    Please see above.  The rest of the review of systems are negative for all systems.    Patient Care Team:  Geoff Toth MD as PCP - General     Patient Active Problem List   Diagnosis     Osteoarthritis Of The Knee     Hypothyroidism, unspecified type     Type 2 diabetes mellitus with complication, without long-term current use of insulin (H)     Vitamin B12 Deficiency     Vitamin D Deficiency     Essential hypertension     Arteriosclerotic Cardiovascular Disease (ASCVD)     Peripheral Neuropathy     Joint Pain, Localized In The Knee     Tremor     Essential tremor     S/P total knee replacement using cement     Status post total left knee replacement     Anesthesia     Hyperlipidemia, unspecified hyperlipidemia type     Neuropathy (H)     Urinary frequency     Past Medical History:   Diagnosis Date     Coronary artery disease      Diabetes mellitus (H)     Type 2     Disease of thyroid gland     hypo     Essential tremor      Hypertension      Peripheral neuropathy       Past Surgical History:   Procedure Laterality Date     CARDIAC CATHETERIZATION       CYSTOSCOPY PROSTATE W/ LASER N/A 7/27/2016    Procedure: CYSTOSCOPY WITH TRANSURETHRAL RESECTION OF THE PROSTATE WITH QUANTA LASER;  Surgeon: Vince Calderon MD;  [place] [facility] [place]:      JOINT REPLACEMENT Right     knee     AK REVISE KNEE JOINT REPLACE,ALL PARTS Right 6/26/2015    Procedure: KNEE TOTAL ARTHROPLASTY REVISION RIGHT;  Surgeon: Ifeanyi Stevens MD;  Location: [facility] OR;  Service: Orthopedics     AK TOTAL KNEE ARTHROPLASTY Left 5/17/2017    Procedure: LEFT TOTAL KNEE ARTHROPLASTY;  Surgeon: Ifeanyi Stevens MD;  Location: [facility] OR;  Service:  Orthopedics     ROTATOR CUFF REPAIR        Family History   Problem Relation Age of Onset     Anesthesia problems Neg Hx       Social History     Social History     Marital status: Single     Spouse name: N/A     Number of children: N/A     Years of education: N/A     Occupational History     Not on file.     Social History Main Topics     Smoking status: Former Smoker     Quit date: 2012     Smokeless tobacco: Never Used     Alcohol use Yes      Comment: Rarely     Drug use: No     Sexual activity: Not on file     Other Topics Concern     Not on file     Social History Narrative      Current Outpatient Prescriptions   Medication Sig Dispense Refill     amLODIPine (NORVASC) 10 MG tablet TAKE ONE TABLET EVERY DAY 90 tablet 2     aspirin 325 MG tablet Take 1 tablet (325 mg total) by mouth 2 (two) times a day with meals. Take for 30 days after surgery. 60 tablet 0     atorvastatin (LIPITOR) 20 MG tablet TAKE ONE TABLET EVERY DAY 90 tablet 2     blood glucose test (ONETOUCH ULTRA TEST) strips USE TO TEST ONCE DAILY 150 strip 3     coenzyme Q10 200 mg capsule Take 200 mg by mouth daily.       cyanocobalamin 1000 MCG tablet Take 1,000 mcg by mouth daily.       docoshexanoic acid-eicosapent 500 mg (FISH OIL) 500-100 mg cap capsule Take 500 mg by mouth daily.        gabapentin (NEURONTIN) 300 MG capsule TAKE ONE CAPSULE THREE TIMES DAILY 270 capsule 2     glucosamine sulfate 500 mg cap Take 500 mg by mouth daily.        lancets (ONETOUCH DELICA LANCETS) 33 gauge Misc USE TO TEST ONCE DAILY 150 each 3     levothyroxine (SYNTHROID, LEVOTHROID) 100 MCG tablet TAKE ONE TABLET EVERY DAY 90 tablet 2     metFORMIN (GLUCOPHAGE) 1000 MG tablet TAKE ONE TABLET TWICE DAILY WITH MEALS 180 tablet 2     metoprolol succinate (TOPROL-XL) 100 MG 24 hr tablet TAKE ONE TABLET EVERY DAY 90 tablet 2     multivitamin with minerals (THERA-M) 9 mg iron-400 mcg Tab tablet Take 1 tablet by mouth daily.       nitroglycerin (NITROSTAT) 0.4 MG SL  tablet Place 0.4 mg under the tongue every 5 (five) minutes as needed for chest pain.       ONETOUCH ULTRA2 Use 1 each As Directed daily. Type 2 diabetes mellitus with complication, without long-term current use of insulin  E11.8 1 each 0     primidone (MYSOLINE) 50 MG tablet Take 200 mg by mouth at bedtime.        triamterene-hydrochlorothiazide (DYAZIDE) 37.5-25 mg per capsule TAKE ONE CAPSULE IN THE MORNING 90 capsule 2     No current facility-administered medications for this visit.       Objective:   Vital Signs:   Visit Vitals     /62     Pulse 63     Temp 97.4  F (36.3  C) (Oral)     Resp 16     Ht 6' (1.829 m)     Wt (!) 259 lb (117.5 kg)     SpO2 96%     BMI 35.13 kg/m2        VisionScreening:  No exam data present     PHYSICAL EXAM  HEENT: Pupils equally round and reactive to light.  Fundi are benign.  Sinuses nontender.  TMs are gray.  Pharynx today reveals no erythema or exudate.  Oral mucosa is moist.  Neck: No thyromegaly present.  No bruits heard.  No lymphadenopathy present.  Lungs: Lungs are clear.  Patient is in no respiratory distress.  Cardiac: There is a regular rhythm.  Rate was about 80/min.  No murmur was heard today.  Pulses are strong in upper and lower extremities.  Abdomen: Abdomen is mildly obese.  It is soft.  No rebound, guarding or rigidity present.  No masses noted.  No hepatomegaly present.  Bowel sounds are active throughout all quadrants.  Musculoskeletal: Upper and lower extremity strength appears normal.  No edema noted in the feet today.  Hips, knees and ankles all have good range of motion.  Mild crepitus noted in the knees.  Neurologic: Patient is alert and oriented.  He is oriented to day and place.  He has difficulty standing from a sitting position without the use of his arms.  His gait was a little unsteady.  He walked with short slightly broad-based steps.  No masked facies noted.  No cogwheeling present.  Deep tendon reflexes appear symmetric.    Assessment  Results 10/17/2018   Activities of Daily Living No help needed   Instrumental Activities of Daily Living No help needed   Mini Cog Total Score 4   Some recent data might be hidden     A Mini-Cog score of 0-2 suggests the possibility of dementia, score of 3-5 suggests no dementia    Identified Health Risks:     The patient s PHQ-9 score is consistent with moderate depression.  He was provided with information regarding depression and was advised to schedule a follow up appointment in 4 weeks to further address this issue.  He is at risk for falling and has been provided with information to reduce the risk of falling at home.  Patient's advanced directive was discussed and I am comfortable with the patient's wishes.